# Patient Record
Sex: FEMALE | Race: WHITE | Employment: FULL TIME | URBAN - METROPOLITAN AREA
[De-identification: names, ages, dates, MRNs, and addresses within clinical notes are randomized per-mention and may not be internally consistent; named-entity substitution may affect disease eponyms.]

---

## 2020-09-28 ENCOUNTER — VIRTUAL VISIT (OUTPATIENT)
Dept: FAMILY MEDICINE CLINIC | Age: 25
End: 2020-09-28
Payer: MEDICAID

## 2020-09-28 DIAGNOSIS — M26.622 ARTHRALGIA OF LEFT TEMPOROMANDIBULAR JOINT: Primary | ICD-10-CM

## 2020-09-28 DIAGNOSIS — M26.609 TMJ DYSFUNCTION: ICD-10-CM

## 2020-09-28 PROCEDURE — 99202 OFFICE O/P NEW SF 15 MIN: CPT | Performed by: NURSE PRACTITIONER

## 2020-09-28 RX ORDER — PREDNISONE 10 MG/1
TABLET ORAL
Qty: 21 TAB | Refills: 0 | Status: SHIPPED | OUTPATIENT
Start: 2020-09-28 | End: 2021-04-13 | Stop reason: ALTCHOICE

## 2020-09-28 RX ORDER — AMITRIPTYLINE HYDROCHLORIDE 25 MG/1
25 TABLET, FILM COATED ORAL
Qty: 30 TAB | Refills: 1 | Status: SHIPPED | OUTPATIENT
Start: 2020-09-28 | End: 2020-11-05 | Stop reason: SINTOL

## 2020-09-28 RX ORDER — METHOCARBAMOL 750 MG/1
TABLET, FILM COATED ORAL
Qty: 40 TAB | Refills: 0 | Status: SHIPPED | OUTPATIENT
Start: 2020-09-28 | End: 2020-11-05 | Stop reason: SDUPTHER

## 2020-09-28 RX ORDER — METHOCARBAMOL 750 MG/1
TABLET, FILM COATED ORAL
COMMUNITY
Start: 2020-07-30 | End: 2020-09-28 | Stop reason: SDUPTHER

## 2020-09-28 RX ORDER — TRIPROLIDINE/PSEUDOEPHEDRINE 2.5MG-60MG
800 TABLET ORAL
Qty: 473 ML | Refills: 1 | Status: SHIPPED | OUTPATIENT
Start: 2020-09-28 | End: 2021-01-07 | Stop reason: ALTCHOICE

## 2020-09-28 RX ORDER — IBUPROFEN 600 MG/1
600 TABLET ORAL
COMMUNITY
Start: 2020-01-20 | End: 2021-01-07 | Stop reason: ALTCHOICE

## 2020-09-28 NOTE — PROGRESS NOTES
Angus Oneal is a 23 y.o. female who was seen by synchronous (real-time) audio-video technology on 9/28/2020 for Jaw Pain    Assessment & Plan:   Diagnoses and all orders for this visit:    1. Arthralgia of left temporomandibular joint    2. TMJ dysfunction  -     REFERRAL TO PHYSICAL THERAPY    Other orders  -     predniSONE (STERAPRED DS) 10 mg dose pack; See administration instruction per 10mg dose pack  -     methocarbamoL (ROBAXIN) 750 mg tablet; take 1 tablet by mouth three times a day as needed for TMJ muscle spasm  -     ibuprofen (ADVIL;MOTRIN) 100 mg/5 mL suspension; Take 40 mL by mouth three (3) times daily as needed (pain, take with food). -     amitriptyline (ELAVIL) 25 mg tablet; Take 1 Tab by mouth nightly. Follow-up and Dispositions    · Return in about 2 weeks (around 10/12/2020) for TMJ left, virtual follow up. I spent at least 30 minutes on this visit with this established patient. [id number]   Subjective:   Patient symptoms have been present since May. States she has had intermittent TMJ on the left after having her wisdom teeth pulled May 2019. Comments symptoms have been manageable until recently. States she was seen by the oral surgeon and informed it was TMJ and prescribed flexeril, prednisone, and ibuprofen. Comments she has not been able to chew. Reports she has only been able to eat soft foods. Comments she has not been able to chew. Reports she has been seen by another oral surgeon and states there provider wanted her to buy a mouth guard which she also ready has. Mother reports patient had complications after having wisdom teeth removed. Reports she had 3 dry sockets and also had to be put under anesthesia x2 due to jaw spasm being so tight. States she was back and forth to the oral surgeon for 6 months after having wisdom teeth extracted. Patient was advised she could try physical therapy but states she can't find someone that will take her insurance.   [employer] Medicaid. Prior to Admission medications    Not on File     There is no problem list on file for this patient. There are no active problems to display for this patient. Allergies not on file  No past medical history on file. No past surgical history on file. No family history on file. Social History     Tobacco Use    Smoking status: Not on file   Substance Use Topics    Alcohol use: Not on file     Review of Systems   HENT:        Jaw pain     Objective:   No flowsheet data found. General: alert, cooperative, no distress   Mental  status: normal mood, behavior, speech, dress, motor activity, and thought processes, able to follow commands   HENT: NCAT   Neck: no visualized mass   Resp: no respiratory distress   Neuro: no gross deficits   Skin: no discoloration or lesions of concern on visible areas   Psychiatric: normal affect, consistent with stated mood, no evidence of hallucinations     Additional exam findings: We discussed the expected course, resolution and complications of the diagnosis(es) in detail. Medication risks, benefits, costs, interactions, and alternatives were discussed as indicated. I advised her to contact the office if her condition worsens, changes or fails to improve as anticipated. She expressed understanding with the diagnosis(es) and plan. Abebe Arellano, who was evaluated through a patient-initiated, synchronous (real-time) audio-video encounter, and/or her healthcare decision maker, is aware that it is a billable service, with coverage as determined by her insurance carrier. She provided verbal consent to proceed: Yes, and patient identification was verified. It was conducted pursuant to the emergency declaration under the [facility], [address] [place] [address] authority and the [employer] General Act. A caregiver was present when appropriate. Ability to conduct physical exam was limited.  I was in the office. The patient was at home.       Lavelle Wheeler NP

## 2020-10-09 ENCOUNTER — HOSPITAL ENCOUNTER (OUTPATIENT)
Dept: PHYSICAL THERAPY | Age: 25
Discharge: HOME | End: 2020-10-09
Payer: COMMERCIAL

## 2020-10-09 PROCEDURE — 97110 THERAPEUTIC EXERCISES: CPT

## 2020-10-09 PROCEDURE — 97032 APPL MODALITY 1+ESTIM EA 15: CPT

## 2020-10-09 PROCEDURE — 97162 PT EVAL MOD COMPLEX 30 MIN: CPT

## 2020-10-09 NOTE — PROGRESS NOTES
[facility]  [address]  [place], [address]  [phone] fax    Plan of Care/ Statement of Necessity for Physical Therapy Services    Patient name: Abiel Whitmore Start of Care: 10/9/2020   Referral source: Travis Arboleda NP : 1995    Medical Diagnosis: TMJ dysfunction [M26.609]  Payor: [address] [place] [address] Road / Plan: [address] / Product Type: Managed Care Medicaid /  Onset Date:May 2020    Treatment Diagnosis: Left TMJ pain   Prior Hospitalization: see medical history Provider#: [id number]   Medications: Verified on Patient summary List    Comorbidities: none reported   Prior Level of Function: Able to talk, chew, eat without pain     The Plan of Care and following information is based on the information from the initial evaluation. Assessment/ key information: 23y.o. year old female presents with CC of chronic left TMJ pain . Patient reports pain began in May 2019 after patient had all four wisdom teeth removed. Patient reports she took NSAIDs for 8 months following surgery; she also was fitted for a mouth guard to wear when sleeping. Patient reports pain was exacerbated this past May. Impairments noted today:  Right lateral jaw deviation with mandibular opening; multiple Trps noted in left masseter, pterygoids, SCM, and scalenes; hypermobile C/S with poor postural awareness and poor deep C/S flexor stability and poor scapular stability. Patient will benefit from physical therapy to address deficits, and ultimately to return patient to prior level of function.     Evaluation Complexity History LOW Complexity : Zero comorbidities / personal factors that will impact the outcome / POC; Examination MEDIUM Complexity : 3 Standardized tests and measures addressing body structure, function, activity limitation and / or participation in recreation  ;Presentation MEDIUM Complexity : Evolving with changing characteristics ;Clinical Decision Making MEDIUM Complexity : FOTO score of 26-74  Overall Complexity Rating: MEDIUM  Problem List: pain affecting function, decrease ROM, decrease strength, decrease ADL/ functional abilitiies, decrease activity tolerance, decrease flexibility/ joint mobility and other postural awareness   Treatment Plan may include any combination of the following: Therapeutic exercise, Therapeutic activities, Neuromuscular re-education, Physical agent/modality, Manual therapy and Patient education  Patient / Family readiness to learn indicated by: asking questions, trying to perform skills and interest  Persons(s) to be included in education: patient (P)  Barriers to Learning/Limitations: None  Patient Goal (s): to be able to talk and eat without pain  Patient Self Reported Health Status: poor  Rehabilitation Potential: good    Short Term Goals: To be accomplished in 2 weeks:  1. I and compliant with HEP for self management of symptoms. Long Term Goals: To be accomplished in 4 weeks:  1. Improve FOTO to 67 to indicate improved function with daily activities. 2. Perform mandibular opening without lateral deviation to increase ease with chewing food. 3. Increase scapular strength B to 4/5 to improve overall stability for postural awareness. 4. Patient will report >50% improvement with pain to increase ease with talking and chewing food. Frequency / Duration: Patient to be seen 2 times per week for 4 weeks. Patient/ Caregiver education and instruction: Diagnosis, prognosis, self care, exercises and other posture   [x]  Plan of care has been reviewed with NNEKA Fischer, PT 10/9/2020 5:06 PM    ________________________________________________________________________    I certify that the above Therapy Services are being furnished while the patient is under my care. I agree with the treatment plan and certify that this therapy is necessary.     [facility] Signature:____________Date:_________TIME:________    [employer], Date and Time must be completed for valid certification **    Please sign and return to In [address]  [address]  [place], [address]  [phone] fax

## 2020-10-09 NOTE — PROGRESS NOTES
PT DAILY TREATMENT NOTE 10-18    Patient Name: Skylar Rachel  Date:10/9/2020  : 1995  [x]  Patient  Verified  Payor: [address] [place] [address] Road / Plan: [address] / Product Type: Managed Care Medicaid /    In time:421  Out time:503  Total Treatment Time (min): 42  Visit #: 1 of 8      Treatment Area: TMJ dysfunction [M26.609]    SUBJECTIVE  Pain Level (0-10 scale): 5  Any medication changes, allergies to medications, adverse drug reactions, diagnosis change, or new procedure performed?: [x] No    [] Yes (see summary sheet for update)  Subjective functional status/changes:   [] No changes reported  See eval    OBJECTIVE    Modality rationale: decrease inflammation, decrease pain and increase tissue extensibility to improve the patients ability to eat and chew food   Min Type Additional Details    [] Estim:  []Unatt       []IFC  []Premod                        []Other:  []w/ice   []w/heat  Position:  Location:   8 [x] Estim: [x]Att    []TENS instruct  []NMES                    []Other:  [x]w/US   []w/ice   []w/heat  Position:supine  Location:left TMJ/SCM    []  Traction: [] Cervical       []Lumbar                       [] Prone          []Supine                       []Intermittent   []Continuous Lbs:  [] before manual  [] after manual    []  Ultrasound: []Continuous   [] Pulsed                           []1MHz   []3MHz W/cm2:  Location:    []  Iontophoresis with dexamethasone         Location: [] Take home patch   [] In clinic    []  Ice     []  heat  []  Ice massage  []  Laser   []  Anodyne Position:  Location:    []  Laser with stim  []  Other:  Position:  Location:    []  Vasopneumatic Device Pressure:       [] lo [] med [] hi   Temperature: [] lo [] med [] hi   [] Skin assessment post-treatment:  []intact []redness- no adverse reaction    []redness  adverse reaction:     15 min [x]Eval                  []Re-Eval       19 min Therapeutic Exercise:  [] See flow sheet :   Rationale: increase ROM and increase strength to improve the patients ability to perform daily activities      With   [] TE   [] TA   [] neuro   [] other: Patient Education: [x] Review HEP    [] Progressed/Changed HEP based on:   [] positioning   [] body mechanics   [] transfers   [] heat/ice application    [] other:      Other Objective/Functional Measures:      Pain Level (0-10 scale) post treatment: 3    ASSESSMENT/Changes in Function: see POC    Patient will continue to benefit from skilled PT services to modify and progress therapeutic interventions, address functional mobility deficits, address ROM deficits, address strength deficits, analyze and address soft tissue restrictions, analyze and cue movement patterns and assess and modify postural abnormalities to attain remaining goals. [x]  See Plan of Care  []  See progress note/recertification  []  See Discharge Summary         Progress towards goals / Updated goals:  Short Term Goals: To be accomplished in 2 weeks:  1. I and compliant with HEP for self management of symptoms  IE: issued HEP. Long Term Goals: To be accomplished in 4 weeks:  1. Improve FOTO to 67 to indicate improved function with daily activities. IE: 64  2. Perform mandibular opening without lateral deviation to increase ease with chewing food. IE: right lateral deviation  3. Increase scapular strength B to 4/5 to improve overall stability for postural awareness. IE: 3/5  4. Patient will report >50% improvement with pain to increase ease with talking and chewing food. IE: 0%  PLAN  []  Upgrade activities as tolerated     [x]  Continue plan of care  []  Update interventions per flow sheet       []  Discharge due to:_  []  Other:_      KAVITA Maciel, BINHPT 10/9/2020  5:13 PM    No future appointments.

## 2020-10-16 ENCOUNTER — HOSPITAL ENCOUNTER (OUTPATIENT)
Dept: PHYSICAL THERAPY | Age: 25
Discharge: HOME | End: 2020-10-16
Payer: COMMERCIAL

## 2020-10-16 PROCEDURE — 97032 APPL MODALITY 1+ESTIM EA 15: CPT

## 2020-10-16 PROCEDURE — 97112 NEUROMUSCULAR REEDUCATION: CPT

## 2020-10-16 PROCEDURE — 97110 THERAPEUTIC EXERCISES: CPT

## 2020-10-16 NOTE — PROGRESS NOTES
PT DAILY TREATMENT NOTE 10-18    Patient Name: Rajendra Villeda  Date:10/16/2020  : 1995  [x]  Patient  Verified  Payor: [address] [place] [address] Road / Plan: [address] / Product Type: Managed Care Medicaid /    In time:1239  Out time:124  Total Treatment Time (min): 45  Visit #: 2 of 8    Treatment Area: TMJ dysfunction [M26.609]    SUBJECTIVE  Pain Level (0-10 scale): 0  Any medication changes, allergies to medications, adverse drug reactions, diagnosis change, or new procedure performed?: [x] No    [] Yes (see summary sheet for update)  Subjective functional status/changes:   [] No changes reported  I just woke up so I don't have any pain. It usually just comes in the evenings or when I'm really stressed out.      OBJECTIVE    Modality rationale: decrease inflammation, decrease pain and increase tissue extensibility to improve the patients ability to chew and eat without pain   Min Type Additional Details    [] Estim:  []Unatt       []IFC  []Premod                        []Other:  []w/ice   []w/heat  Position:  Location:   8 [x] Estim: [x]Att    []TENS instruct  []NMES                    []Other:  [x]w/US   []w/ice   []w/heat  Position:supine  Location:left TMJ and SCM/scalenes    []  Traction: [] Cervical       []Lumbar                       [] Prone          []Supine                       []Intermittent   []Continuous Lbs:  [] before manual  [] after manual    []  Ultrasound: []Continuous   [] Pulsed                           []1MHz   []3MHz W/cm2:  Location:    []  Iontophoresis with dexamethasone         Location: [] Take home patch   [] In clinic    []  Ice     []  heat  []  Ice massage  []  Laser   []  Anodyne Position:  Location:    []  Laser with stim  []  Other:  Position:  Location:    []  Vasopneumatic Device Pressure:       [] lo [] med [] hi   Temperature: [] lo [] med [] hi   [] Skin assessment post-treatment:  []intact []redness- no adverse reaction        22 min Therapeutic Exercise:  [] See flow sheet :   Rationale: increase ROM and increase strength to improve the patients ability to perform daily activities      15 min Neuromuscular Re-education:  []  See flow sheet :   Rationale: increase ROM, increase strength, improve balance and increase proprioception  to improve the patients stability for postural awareness for work tasks    With   [] TE   [] TA   [] neuro   [] other: Patient Education: [x] Review HEP    [] Progressed/Changed HEP based on:   [] positioning   [] body mechanics   [] transfers   [] heat/ice application    [] other:      Other Objective/Functional Measures:      Pain Level (0-10 scale) post treatment: 5    ASSESSMENT/Changes in Function: Poor T/S mobility noted in ext. Added 1/2 foam pec stretch to improve T/S mobility. Tactile cueing to avoid dumping into ext with QP exercises. Patient will continue to benefit from skilled PT services to modify and progress therapeutic interventions, address functional mobility deficits, address ROM deficits, address strength deficits, analyze and address soft tissue restrictions, analyze and cue movement patterns and assess and modify postural abnormalities to attain remaining goals. []  See Plan of Care  []  See progress note/recertification  []  See Discharge Summary         Progress towards goals / Updated goals:  Short Term Goals: To be accomplished in 2 weeks:  1. I and compliant with HEP for self management of symptoms  IE: issued HEP  Current: reports compliance 10/16/20.   Long Term Goals: To be accomplished in 4 weeks:  1. Improve FOTO to 67 to indicate improved function with daily activities. IE: 64  2. Perform mandibular opening without lateral deviation to increase ease with chewing food. IE: right lateral deviation  3. Increase scapular strength B to 4/5 to improve overall stability for postural awareness. IE: 3/5  4.  Patient will report >50% improvement with pain to increase ease with talking and chewing food.   IE: 0%    PLAN  []  Upgrade activities as tolerated     [x]  Continue plan of care  []  Update interventions per flow sheet       []  Discharge due to:_  []  Other:_      Kristina Cleveland, MPT, CMTPT 10/16/2020  8:57 AM    Future Appointments   Date Time Provider Rory Elliott   10/16/2020 12:45 PM Rin Edwards, PT [facility]   10/20/2020 12:45 PM Rin Edwards, PT [facility]   10/23/2020  1:30 PM Rin Edwards, PT [facility]   10/27/2020 12:00 PM Rin Edwards, PT [facility]   10/30/2020 12:45 PM Rin Edwards, PT [facility]   11/3/2020 12:00 PM Rin Edwards, PT [facility]   11/6/2020  2:15 PM Rin Edwards, PT [facility]

## 2020-10-20 ENCOUNTER — HOSPITAL ENCOUNTER (OUTPATIENT)
Dept: PHYSICAL THERAPY | Age: 25
Discharge: HOME | End: 2020-10-20
Payer: COMMERCIAL

## 2020-10-20 PROCEDURE — 97110 THERAPEUTIC EXERCISES: CPT | Performed by: PHYSICAL THERAPIST

## 2020-10-20 PROCEDURE — 97112 NEUROMUSCULAR REEDUCATION: CPT | Performed by: PHYSICAL THERAPIST

## 2020-10-20 PROCEDURE — 97035 APP MDLTY 1+ULTRASOUND EA 15: CPT | Performed by: PHYSICAL THERAPIST

## 2020-10-23 ENCOUNTER — HOSPITAL ENCOUNTER (OUTPATIENT)
Dept: PHYSICAL THERAPY | Age: 25
Discharge: HOME | End: 2020-10-23
Payer: COMMERCIAL

## 2020-10-23 PROCEDURE — 97032 APPL MODALITY 1+ESTIM EA 15: CPT

## 2020-10-23 PROCEDURE — 97110 THERAPEUTIC EXERCISES: CPT

## 2020-10-23 PROCEDURE — 97112 NEUROMUSCULAR REEDUCATION: CPT

## 2020-10-23 NOTE — PROGRESS NOTES
PT DAILY TREATMENT NOTE 10-18    Patient Name: Estefania Fritz  Date:10/23/2020  : 1995  [x]  Patient  Verified  Payor: [address] [place] [address] Road / Plan: [address] / Product Type: Managed Care Medicaid /    In time:147  Out time:211  Total Treatment Time (min): 24  Visit #: 4 of 8  16' late    Treatment Area: TMJ dysfunction [M26.609]    SUBJECTIVE  Pain Level (0-10 scale): 5  Any medication changes, allergies to medications, adverse drug reactions, diagnosis change, or new procedure performed?: [] No    [x] Yes (see summary sheet for update)  Subjective functional status/changes:   [] No changes reported  I stopped taking my antidepressant. I didn't like how it made me feel. Reports she thinks the medicine was helping her pain, but the side effects were not worth taking the medicine.     OBJECTIVE    Modality rationale: decrease pain and increase tissue extensibility to improve the patients ability to chew and talk without pain   Min Type Additional Details    [] Estim:  []Unatt       []IFC  []Premod                        []Other:  []w/ice   []w/heat  Position:  Location:   8 [x] Estim: [x]Att    []TENS instruct  []NMES                    []Other:  [x]w/US   []w/ice   []w/heat  Position:supine  Location:left TMJ    []  Traction: [] Cervical       []Lumbar                       [] Prone          []Supine                       []Intermittent   []Continuous Lbs:  [] before manual  [] after manual    []  Ultrasound: []Continuous   [] Pulsed                           []1MHz   []3MHz W/cm2:  Location:    []  Iontophoresis with dexamethasone         Location: [] Take home patch   [] In clinic    []  Ice     []  heat  []  Ice massage  []  Laser   []  Anodyne Position:  Location:    []  Laser with stim  []  Other:  Position:  Location:    []  Vasopneumatic Device Pressure:       [] lo [] med [] hi   Temperature: [] lo [] med [] hi   [] Skin assessment post-treatment:  []intact []redness- no adverse reaction    []redness  adverse reaction:       8 min Therapeutic Exercise:  [] See flow sheet :   Rationale: increase ROM and increase strength to improve the patients ability to perform daily activities      8 min Neuromuscular Re-education:  []  See flow sheet :   Rationale: increase strength, improve coordination, improve balance and increase proprioception  to improve the patients postural awareness and stability     With   [] TE   [] TA   [] neuro   [] other: Patient Education: [x] Review HEP    [] Progressed/Changed HEP based on:   [] positioning   [] body mechanics   [] transfers   [] heat/ice application    [] other:      Other Objective/Functional Measures:      Pain Level (0-10 scale) post treatment: 0    ASSESSMENT/Changes in Function: Increased ability to perform lateral deviation equally B. Patient admits that she clenches jaw consistently throughout the day. She reports she is going to speak to her doctor about possibly taking anti-anxiety medication. Patient will continue to benefit from skilled PT services to modify and progress therapeutic interventions, address functional mobility deficits, address strength deficits, analyze and address soft tissue restrictions, analyze and cue movement patterns and assess and modify postural abnormalities to attain remaining goals. []  See Plan of Care  []  See progress note/recertification  []  See Discharge Summary         Progress towards goals / Updated goals:  Short Term Goals: To be accomplished in 2 weeks:  1. I and compliant with HEP for self management of symptoms  IE: issued HEP  Current: reports compliance 10/16/20.   Long Term Goals: To be accomplished in 4 weeks:  1. Improve FOTO GO 46 YV indicate improved function with daily activities.   IE: 56  2. Perform mandibular opening without lateral deviation to increase ease with chewing food.   IE: right lateral deviation  Current: remains w/ right lateral deviation, improved after manual today 10/20/2020  3. Increase scapular strength B to 4/5 to improve overall stability for postural awareness. IE: 3/5  4.  Patient will report >50% improvement with pain to increase ease with talking and chewing food.   IE: 0%    PLAN  []  Upgrade activities as tolerated     [x]  Continue plan of care  []  Update interventions per flow sheet       []  Discharge due to:_  []  Other:_      Kristina Cleveland, KAVITA, CMTPT 10/23/2020  9:08 AM    Future Appointments   Date Time Provider Rory Elliott   10/23/2020  1:30 PM Rin Edwards, PT [facility]   10/27/2020 12:00 PM Rin Edwards, PT [facility]   10/30/2020 12:45 PM Rin Edwards PT [facility]   11/3/2020 12:00 PM Rin Edwards, PT [facility]   11/5/2020  1:00 PM Mounika Hartman NP [facility]   11/6/2020  2:15 PM Rin Edwards, PT [facility]

## 2020-10-27 ENCOUNTER — HOSPITAL ENCOUNTER (OUTPATIENT)
Dept: PHYSICAL THERAPY | Age: 25
Discharge: HOME | End: 2020-10-27
Payer: COMMERCIAL

## 2020-10-27 PROCEDURE — 97112 NEUROMUSCULAR REEDUCATION: CPT

## 2020-10-27 PROCEDURE — 97032 APPL MODALITY 1+ESTIM EA 15: CPT

## 2020-10-27 PROCEDURE — 97110 THERAPEUTIC EXERCISES: CPT

## 2020-10-27 NOTE — PROGRESS NOTES
PT DAILY TREATMENT NOTE 10-18    Patient Name: Masoud Cancino  Date:10/27/2020  : 1995  [x]  Patient  Verified  Payor: [address] [place] [address] Road / Plan: [address] / Product Type: Managed Care Medicaid /    In time:1200  Out time:1244  Total Treatment Time (min): 44  Visit #: 5 of 8    Treatment Area: TMJ dysfunction [M26.609]    SUBJECTIVE  Pain Level (0-10 scale): 3  Any medication changes, allergies to medications, adverse drug reactions, diagnosis change, or new procedure performed?: [x] No    [] Yes (see summary sheet for update)  Subjective functional status/changes:   [] No changes reported  My pain isn't that bad today because I just woke up and I haven't done much talking.     OBJECTIVE    Modality rationale: decrease inflammation, decrease pain and increase tissue extensibility to improve the patients ability to chew and talk without pain   Min Type Additional Details    [] Estim:  []Unatt       []IFC  []Premod                        []Other:  []w/ice   []w/heat  Position:  Location:   8  With setup [x] Estim: [x]Att    []TENS instruct  []NMES                    []Other:  [x]w/US   []w/ice   []w/heat  Position:supine  Location:left TMJ    []  Traction: [] Cervical       []Lumbar                       [] Prone          []Supine                       []Intermittent   []Continuous Lbs:  [] before manual  [] after manual    []  Ultrasound: []Continuous   [] Pulsed                           []1MHz   []3MHz W/cm2:  Location:    []  Iontophoresis with dexamethasone         Location: [] Take home patch   [] In clinic    []  Ice     []  heat  []  Ice massage  []  Laser   []  Anodyne Position:  Location:    []  Laser with stim  []  Other:  Position:  Location:    []  Vasopneumatic Device Pressure:       [] lo [] med [] hi   Temperature: [] lo [] med [] hi   [] Skin assessment post-treatment:  []intact []redness- no adverse reaction    []redness - adverse reaction:       19 min Therapeutic Exercise:  [] See flow sheet :   Rationale: increase ROM and increase strength to improve the patients ability to perfomr daily activities      15 min Neuromuscular Re-education:  []  See flow sheet :   Rationale: increase strength, improve coordination, improve balance and increase proprioception  to improve the patients ability to recruit anterior and posterior chain appropriately for postural awareness     2 min Manual Therapy:  STM/ TrP release left masseter   Rationale: decrease pain, increase ROM and increase tissue extensibility to allow for pain-free talking and chewing         With   [] TE   [] TA   [] neuro   [] other: Patient Education: [x] Review HEP    [] Progressed/Changed HEP based on:   [] positioning   [] body mechanics   [] transfers   [] heat/ice application    [] other:      Other Objective/Functional Measures:      Pain Level (0-10 scale) post treatment: 2    ASSESSMENT/Changes in Function: No lateral jaw deviation noted today with AROM. Less cueing for form with most exercises. Able to maintain axial elongation during prone c/S retraction. Patient will continue to benefit from skilled PT services to modify and progress therapeutic interventions, address functional mobility deficits, address ROM deficits, address strength deficits, analyze and address soft tissue restrictions, analyze and cue movement patterns and assess and modify postural abnormalities to attain remaining goals. []  See Plan of Care  []  See progress note/recertification  []  See Discharge Summary         Progress towards goals / Updated goals:  Short Term Goals: To be accomplished in 2 weeks:  1. I and compliant with HEP for self management of symptoms  IE: issued HEP  Current: reports compliance 10/16/20.   Long Term Goals: To be accomplished in 4 weeks:  1. Improve FOTO YF 78 NE indicate improved function with daily activities.   IE: 56  2.  Perform mandibular opening without lateral deviation to increase ease with chewing food. IE: right lateral deviation  Current: remains w/ right lateral deviation, improved after manual today 10/20/2020; no lateral deviation today 10/27/20  3. Increase scapular strength B to 4/5 to improve overall stability for postural awareness. IE: 3/5  4.  Patient will report >50% improvement with pain to increase ease with talking and   IE: 0%  Current:   PLAN  []  Upgrade activities as tolerated     [x]  Continue plan of care  []  Update interventions per flow sheet       []  Discharge due to:_  []  Other:_      Cheryl Peralta, MPT, CMTPT 10/27/2020  8:26 AM    Future Appointments   Date Time Provider Rory Elliott   10/27/2020 12:00 PM [facility] [place]   10/30/2020 12:45 PM Leveda Navy, PT [facility]   11/3/2020 12:00 PM Leveda [place], PT [facility]   11/5/2020  1:00 PM Fang Valerio NP [facility] BS AMB   11/6/2020  2:15 PM Leveda [place], PT [facility]

## 2020-10-30 ENCOUNTER — HOSPITAL ENCOUNTER (OUTPATIENT)
Dept: PHYSICAL THERAPY | Age: 25
Discharge: HOME | End: 2020-10-30
Payer: COMMERCIAL

## 2020-10-30 PROCEDURE — 97112 NEUROMUSCULAR REEDUCATION: CPT

## 2020-10-30 PROCEDURE — 97032 APPL MODALITY 1+ESTIM EA 15: CPT

## 2020-10-30 PROCEDURE — 97110 THERAPEUTIC EXERCISES: CPT

## 2020-10-30 NOTE — PROGRESS NOTES
PT DAILY TREATMENT NOTE 10-18    Patient Name: Rashida Briggs  Date:10/30/2020   : 1995  [x]  Patient  Verified  Payor: [address] [place] [address] Road / Plan: [address] / Product Type: Managed Care Medicaid /    In time:1246  Out time:128  Total Treatment Time (min): 42  Visit #: 6 of 8      Treatment Area: TMJ dysfunction [M26.609]    SUBJECTIVE  Pain Level (0-10 scale): 2  Any medication changes, allergies to medications, adverse drug reactions, diagnosis change, or new procedure performed?: [x] No    [] Yes (see summary sheet for update)  Subjective functional status/changes:   [] No changes reported  The intensity of the pain is not as bad. But I do feel a difference in the pain since coming off the antidepressant. The medication helped the pain, but I didn't like how I felt on it. I go back to the doctor next week, so hopefully she will be able to put me on something for my anxiety.      OBJECTIVE    Modality rationale: decrease pain to improve the patients ability to tolerate eating and talking without pain   Min Type Additional Details    [] Estim:  []Unatt       []IFC  []Premod                        []Other:  []w/ice   []w/heat  Position:  Location:   8 [x] Estim: []Att    []TENS instruct  []NMES                    []Other:  [x]w/US   []w/ice   []w/heat  Position:supine  Location:left TMJ    []  Traction: [] Cervical       []Lumbar                       [] Prone          []Supine                       []Intermittent   []Continuous Lbs:  [] before manual  [] after manual    []  Ultrasound: []Continuous   [] Pulsed                           []1MHz   []3MHz W/cm2:  Location:    []  Iontophoresis with dexamethasone         Location: [] Take home patch   [] In clinic    []  Ice     []  heat  []  Ice massage  []  Laser   []  Anodyne Position:  Location:    []  Laser with stim  []  Other:  Position:  Location:    []  Vasopneumatic Device Pressure:       [] lo [] med [] hi   Temperature: [] lo [] med [] hi   [] Skin assessment post-treatment:  []intact []redness- no adverse reaction        17 min Therapeutic Exercise:  [] See flow sheet :   Rationale: increase ROM and increase strength to improve the patients ability to perform daily activities      15 min Neuromuscular Re-education:  []  See flow sheet :   Rationale: increase strength  to improve the patients anterior and posterior chain for stability    2 min Manual Therapy:  STM/TrP release to left TMJ and masseter   Rationale: decrease pain, increase ROM, increase tissue extensibility and decrease trigger points to eat and talk without pain        With   [] TE   [] TA   [] neuro   [] other: Patient Education: [x] Review HEP    [] Progressed/Changed HEP based on:   [] positioning   [] body mechanics   [] transfers   [] heat/ice application    [] other:      Other Objective/Functional Measures:      Pain Level (0-10 scale) post treatment: 2    ASSESSMENT/Changes in Function: Added 1# to prone scapular stability exercises; tolerated exercises well without cueing. Patient will continue to benefit from skilled PT services to modify and progress therapeutic interventions, address strength deficits, analyze and address soft tissue restrictions, analyze and cue movement patterns and assess and modify postural abnormalities to attain remaining goals. []  See Plan of Care  []  See progress note/recertification  []  See Discharge Summary         Progress towards goals / Updated goals:  Short Term Goals: To be accomplished in 2 weeks:  1. I and compliant with HEP for self management of symptoms  IE: issued HEP  Current: reports compliance 10/16/20.   Long Term Goals: To be accomplished in 4 weeks:  1. Improve FOTO [address] 53 XW indicate improved function with daily activities.   IE: 56  2. Perform mandibular opening without lateral deviation to increase ease with chewing food.   IE: right lateral deviation  Current: remains w/ right lateral deviation, improved after manual today 10/20/2020; no lateral deviation today 10/27/20  3. Increase scapular strength B to 4/5 to improve overall stability for postural awareness. IE: 3/5  Current: grossly 3+/5 10/30/20  4.  Patient will report >50% improvement with pain to increase ease with talking and   IE: 0%  Current:     PLAN  []  Upgrade activities as tolerated     [x]  Continue plan of care  []  Update interventions per flow sheet       []  Discharge due to:_  []  Other:_      Dorothy Galvan, KAVITA, CMTPT 10/30/2020  8:44 AM    Future Appointments   Date Time Provider Rory Elliott   10/30/2020 12:45 PM Merline Gammon B, PT [facility]   11/3/2020 12:00 PM Lb Patient, PT [facility]   11/5/2020  1:00 PM Amaris Reynolds NP [facility] BS AMB   11/6/2020  2:15 PM Lb Patient, PT [facility]

## 2020-11-03 ENCOUNTER — HOSPITAL ENCOUNTER (OUTPATIENT)
Dept: PHYSICAL THERAPY | Age: 25
Discharge: HOME | End: 2020-11-03
Payer: COMMERCIAL

## 2020-11-03 PROCEDURE — 97110 THERAPEUTIC EXERCISES: CPT

## 2020-11-03 PROCEDURE — 97112 NEUROMUSCULAR REEDUCATION: CPT

## 2020-11-03 PROCEDURE — 97035 APP MDLTY 1+ULTRASOUND EA 15: CPT

## 2020-11-03 NOTE — PROGRESS NOTES
PT DAILY TREATMENT NOTE 10-18    Patient Name: Abebe Arellano  Date:11/3/2020  : 1995  [x]  Patient  Verified  Payor: [address] [place] [address] Road / Plan: [address] / Product Type: Managed Care Medicaid /    In time:1200  Out time:1250  Total Treatment Time (min): 50  Visit #: 7 of 8  Treatment Area: TMJ dysfunction [M26.609]    SUBJECTIVE  Pain Level (0-10 scale): 1-2  Any medication changes, allergies to medications, adverse drug reactions, diagnosis change, or new procedure performed?: [x] No    [] Yes (see summary sheet for update)  Subjective functional status/changes:   [] No changes reported  I had a rough couple of days. I tried to eat steak the other night. About an hour afterwards, I was in so much pain. It's not as bad as it used to be because the pain was constant and now it's intermittent.        OBJECTIVE    Modality rationale: decrease pain to improve the patients ability to eat and chew without pain    Min Type Additional Details    [] Estim:  []Unatt       []IFC  []Premod                        []Other:  []w/ice   []w/heat  Position:  Location:    [] Estim: []Att    []TENS instruct  []NMES                    []Other:  []w/US   []w/ice   []w/heat  Position:  Location:    []  Traction: [] Cervical       []Lumbar                       [] Prone          []Supine                       []Intermittent   []Continuous Lbs:  [] before manual  [] after manual   8 [x]  Ultrasound: [x]Continuous   [] Pulsed                           [x]1MHz   []3MHz W/cm2:1.0  Location:left TMJ    []  Iontophoresis with dexamethasone         Location: [] Take home patch   [] In clinic    []  Ice     []  heat  []  Ice massage  []  Laser   []  Anodyne Position:  Location:    []  Laser with stim  []  Other:  Position:  Location:    []  Vasopneumatic Device Pressure:       [] lo [] med [] hi   Temperature: [] lo [] med [] hi   [] Skin assessment post-treatment:  []intact []redness- no adverse reaction []redness - adverse reaction:       19 min Therapeutic Exercise:  [] See flow sheet :   Rationale: increase ROM and increase strength to improve the patients ability to perform daily activities      20 min Neuromuscular Re-education:  []  See flow sheet :   Rationale: increase strength, improve coordination, improve balance and increase proprioception  to improve the patients ability to recruit ant and post chain appropriately for daily activities     3 min Manual Therapy:  STM left masseter/ TMJ, SCM   Rationale: decrease pain, increase ROM, increase tissue extensibility and decrease trigger points to decrease pain for chewing and eating        With   [] TE   [] TA   [] neuro   [] other: Patient Education: [x] Review HEP    [] Progressed/Changed HEP based on:   [] positioning   [] body mechanics   [] transfers   [] heat/ice application    [] other:      Other Objective/Functional Measures:      Pain Level (0-10 scale) post treatment: 0    ASSESSMENT/Changes in Function: Patient reports 50% improvement, as the pain in now intermittent instead of constant. Patient has f/u appointment with referring MD, and is going to inquire about anti-anxiety medicine. She is also going to get an exemption from wearing a mask to also help with symptom management. Patient will continue to benefit from skilled PT services to modify and progress therapeutic interventions, address functional mobility deficits, address ROM deficits, address strength deficits, analyze and address soft tissue restrictions, analyze and cue movement patterns and assess and modify postural abnormalities to attain remaining goals. []  See Plan of Care  []  See progress note/recertification  []  See Discharge Summary         Progress towards goals / Updated goals:  Short Term Goals: To be accomplished in 2 weeks:  1.  I and compliant with HEP for self management of symptoms  IE: issued HEP  Current: reports compliance 10/16/20.   [facility] be accomplished in 4 weeks:  1. Improve FOTO RZ 44 HY indicate improved function with daily activities.   IE: 56  2. Perform mandibular opening without lateral deviation to increase ease with chewing food. IE: right lateral deviation  Current: remains w/ right lateral deviation, improved after manual today 10/20/2020; no lateral deviation today 10/27/20  3. Increase scapular strength B to 4/5 to improve overall stability for postural awareness. IE: 3/5  Current: grossly 3+/5 10/30/20  4.  Patient will report >50% improvement with pain to increase ease with talking and   IE: 0%  Current:50% improvement 11/3/20     PLAN  []  Upgrade activities as tolerated     [x]  Continue plan of care  []  Update interventions per flow sheet       []  Discharge due to:_  []  Other:_      Kevin Rogers, KAVITA, CMTPT 11/3/2020  8:42 AM    Future Appointments   Date Time Provider Rory Elliott   11/3/2020 12:00 PM [facility]   11/5/2020  1:00 PM Juan Harris NP [facility] BS AMB   11/6/2020  2:15 PM Shanel Hunt PT [facility]

## 2020-11-05 ENCOUNTER — VIRTUAL VISIT (OUTPATIENT)
Dept: FAMILY MEDICINE CLINIC | Age: 25
End: 2020-11-05
Payer: MEDICAID

## 2020-11-05 DIAGNOSIS — F41.9 ANXIETY: ICD-10-CM

## 2020-11-05 DIAGNOSIS — M26.609 TMJ DYSFUNCTION: Primary | ICD-10-CM

## 2020-11-05 PROCEDURE — 99213 OFFICE O/P EST LOW 20 MIN: CPT | Performed by: NURSE PRACTITIONER

## 2020-11-05 RX ORDER — BUSPIRONE HYDROCHLORIDE 5 MG/1
5 TABLET ORAL 2 TIMES DAILY
Qty: 60 TAB | Refills: 0 | Status: SHIPPED | OUTPATIENT
Start: 2020-11-05 | End: 2020-11-19 | Stop reason: SDUPTHER

## 2020-11-05 RX ORDER — METHOCARBAMOL 750 MG/1
TABLET, FILM COATED ORAL
Qty: 40 TAB | Refills: 0 | Status: SHIPPED | OUTPATIENT
Start: 2020-11-05 | End: 2020-11-19 | Stop reason: DRUGHIGH

## 2020-11-05 NOTE — PROGRESS NOTES
Carito Sifuentes is a 23 y.o. female who was seen by synchronous (real-time) audio-video technology on 11/5/2020 for TMJ and Anxiety        Assessment & Plan:   Diagnoses and all orders for this visit:    1. TMJ dysfunction    2. Anxiety    Other orders  -     busPIRone (BUSPAR) 5 mg tablet; Take 1 Tab by mouth two (2) times a day. -     methocarbamoL (ROBAXIN) 750 mg tablet; take 1 tablet by mouth three times a day as needed for TMJ muscle spasm      Letter provided to wear face shield    Follow-up and Dispositions    · Return in about 2 weeks (around 11/19/2020) for anxiety/depression. I spent at least 15 minutes on this visit with this established patient. 712  Subjective:   Patient states TMJ has gotten a little better with PT but still is not able to eat much. Comments she thinks the mask she has to wear at work is restricting her jaw movement making pain worse. Requesting a doctor's note to wear only a face shield. Comments elavil caused her to feel like a zombie, she was crying and making her depressed. Inquiring about starting medication for anxiety. Comments she clinches her jaw due to anxiety. Reports she did notice one day she was clincching her jaw. Comments she didn't feel anxious at the time. Comments she thinks she just an anxious person. Thinks it's subconscious.     3 most recent PHQ Screens 11/5/2020   Little interest or pleasure in doing things More than half the days   Feeling down, depressed, irritable, or hopeless More than half the days   Total Score PHQ 2 4   Trouble falling or staying asleep, or sleeping too much More than half the days   Feeling tired or having little energy More than half the days   Poor appetite, weight loss, or overeating More than half the days   Feeling bad about yourself - or that you are a failure or have let yourself or your family down More than half the days   Trouble concentrating on things such as school, work, reading, or watching TV Several days Moving or speaking so slowly that other people could have noticed; or the opposite being so fidgety that others notice Not at all   Thoughts of being better off dead, or hurting yourself in some way Several days   PHQ 9 Score 14   How difficult have these problems made it for you to do your work, take care of your home and get along with others Very difficult         BSHSI AMB TREVOR-7 SCORE: 12 moderate anxiety, Somewhat difficult         Depression Severity:   0-4 None, 5-9 mild, 10-14 moderate, 15-19 moderately severe, 20-27 severe. Anxiety Severity:   0-4 None/minimal, 5-9 mild, 10-14 moderate, 15-21 severe. Prior to Admission medications    Medication Sig Start Date End Date Taking? Authorizing Provider   ibuprofen (MOTRIN) 600 mg tablet Take 600 mg by mouth every six (6) hours as needed. 1/20/20   Provider, Historical   predniSONE (STERAPRED DS) 10 mg dose pack See administration instruction per 10mg dose pack 9/28/20   Danial NGUYEN NP   methocarbamoL (ROBAXIN) 750 mg tablet take 1 tablet by mouth three times a day as needed for TMJ muscle spasm 9/28/20   Danial NGUYEN NP   ibuprofen (ADVIL;MOTRIN) 100 mg/5 mL suspension Take 40 mL by mouth three (3) times daily as needed (pain, take with food). 9/28/20   Danial NGUYEN NP   amitriptyline (ELAVIL) 25 mg tablet Take 1 Tab by mouth nightly. 9/28/20   Juan Harris NP     There is no problem list on file for this patient. There are no active problems to display for this patient. Current Outpatient Medications   Medication Sig Dispense Refill    ibuprofen (MOTRIN) 600 mg tablet Take 600 mg by mouth every six (6) hours as needed.       predniSONE (STERAPRED DS) 10 mg dose pack See administration instruction per 10mg dose pack 21 Tab 0    methocarbamoL (ROBAXIN) 750 mg tablet take 1 tablet by mouth three times a day as needed for TMJ muscle spasm 40 Tab 0    ibuprofen (ADVIL;MOTRIN) 100 mg/5 mL suspension Take 40 mL by mouth three (3) times daily as needed (pain, take with food). 473 mL 1    amitriptyline (ELAVIL) 25 mg tablet Take 1 Tab by mouth nightly. 30 Tab 1     No Known Allergies  No past medical history on file. No past surgical history on file. No family history on file. Social History     Tobacco Use    Smoking status: Never Smoker   Substance Use Topics    Alcohol use: No     ROS    Objective:   No flowsheet data found. General: alert, cooperative, no distress   Mental  status: normal mood, behavior, speech, dress, motor activity, and thought processes, able to follow commands   HENT: NCAT   Neck: no visualized mass   Resp: no respiratory distress   Neuro: no gross deficits   Skin: no discoloration or lesions of concern on visible areas   Psychiatric: normal affect, consistent with stated mood, no evidence of hallucinations     Additional exam findings: We discussed the expected course, resolution and complications of the diagnosis(es) in detail. Medication risks, benefits, costs, interactions, and alternatives were discussed as indicated. I advised her to contact the office if her condition worsens, changes or fails to improve as anticipated. She expressed understanding with the diagnosis(es) and plan. Rashida Briggs, who was evaluated through a patient-initiated, synchronous (real-time) audio-video encounter, and/or her healthcare decision maker, is aware that it is a billable service, with coverage as determined by her insurance carrier. She provided verbal consent to proceed: Yes, and patient identification was verified. It was conducted pursuant to the emergency declaration under the [address] [place] [address], [address] [place] [address] authority and the [employer] Act. A caregiver was present when appropriate. Ability to conduct physical exam was limited. I was in the office. The patient was at work.       Casilda Dandy, NP

## 2020-11-05 NOTE — LETTER
11/5/2020 1:20 PM    Ms. Ivone Morel  [id number] [phone]      To Whom It May Concern:    Ivone Morel is currently under the care of India Gonzalez Dr. Please allow her to wear face shield due to mask limiting jaw movement. If there are questions or concerns please have the patient contact our office.         Sincerely,      Christopher Leon NP

## 2020-11-06 ENCOUNTER — HOSPITAL ENCOUNTER (OUTPATIENT)
Dept: PHYSICAL THERAPY | Age: 25
Discharge: HOME | End: 2020-11-06
Payer: COMMERCIAL

## 2020-11-06 PROCEDURE — 97110 THERAPEUTIC EXERCISES: CPT

## 2020-11-06 PROCEDURE — 97112 NEUROMUSCULAR REEDUCATION: CPT

## 2020-11-06 PROCEDURE — 97035 APP MDLTY 1+ULTRASOUND EA 15: CPT

## 2020-11-06 NOTE — PROGRESS NOTES
PT DAILY TREATMENT NOTE 10-18    Patient Name: Juana Toth  Date:2020  : 1995  [x]  Patient  Verified  Payor: [address] [place] [address] Road / Plan: [address] / Product Type: Managed Care Medicaid /    In time:215  Out time:300  Total Treatment Time (min): 45  Visit #: 8 of 8  Treatment Area: TMJ dysfunction [M26.609]    SUBJECTIVE  Pain Level (0-10 scale): 1  Any medication changes, allergies to medications, adverse drug reactions, diagnosis change, or new procedure performed?: [] No    [x] Yes (see summary sheet for update)  Subjective functional status/changes:   [] No changes reported  I got on new anxiety medicine. And the doctor is writing a note for me to exempted from wearing a mask at work to help my TMJ.     OBJECTIVE    Modality rationale: decrease inflammation and decrease pain to improve the patients ability to eat and chew without pain   Min Type Additional Details    [] Estim:  []Unatt       []IFC  []Premod                        []Other:  []w/ice   []w/heat  Position:  Location:    [] Estim: []Att    []TENS instruct  []NMES                    []Other:  []w/US   []w/ice   []w/heat  Position:  Location:    []  Traction: [] Cervical       []Lumbar                       [] Prone          []Supine                       []Intermittent   []Continuous Lbs:  [] before manual  [] after manual   8 with setup [x]  Ultrasound: [x]Continuous   [] Pulsed                           [x]1MHz   []3MHz W/cm2:1.0  Location:left TMJ/masseter    []  Iontophoresis with dexamethasone         Location: [] Take home patch   [] In clinic    []  Ice     []  heat  []  Ice massage  []  Laser   []  Anodyne Position:  Location:    []  Laser with stim  []  Other:  Position:  Location:    []  Vasopneumatic Device Pressure:       [] lo [] med [] hi   Temperature: [] lo [] med [] hi   [] Skin assessment post-treatment:  []intact []redness- no adverse reaction    []redness - adverse reaction:       15 min Therapeutic Exercise:  [] See flow sheet :   Rationale: increase ROM and increase strength to improve the patients ability to perform daily activities      20 min Neuromuscular Re-education:  []  See flow sheet :   Rationale: increase strength and improve coordination  to improve the patients postural awareness for daily activities     2 min Manual Therapy: TrP release to left masseter   The manual therapy interventions were performed at a separate and distinct time from the therapeutic activities interventions. Rationale: decrease pain, increase ROM, increase tissue extensibility and decrease trigger points to reduce stress when trying to eat and chew food  With   [] TE   [] TA   [] neuro   [] other: Patient Education: [x] Review HEP    [] Progressed/Changed HEP based on:   [] positioning   [] body mechanics   [] transfers   [] heat/ice application    [] other:      Other Objective/Functional Measures:  See below     Pain Level (0-10 scale) post treatment: 1    ASSESSMENT/Changes in Function: Patient is slowly progressing towards goals. She continues to have pain with chewing tough food (I.e. meat and bread), and has pain when she is required to talk a lot at work. She would benefit from continuing with PT to further progress towards goals. Patient will continue to benefit from skilled PT services to modify and progress therapeutic interventions, address strength deficits, analyze and address soft tissue restrictions, analyze and cue movement patterns and assess and modify postural abnormalities to attain remaining goals. []  See Plan of Care  [x]  See progress note/recertification  []  See Discharge Summary         Progress towards goals / Updated goals:  Short Term Goals: To be accomplished in 2 weeks:  1. I and compliant with HEP for self management of symptoms  IE: issued HEP  Current: reports compliance .   Long Term Goals: To be accomplished in 4 weeks:  1.  Improve FOTO to 67 to indicate improved function with daily activities.   IE: 56  Current: 74- goal met  2. Perform mandibular opening without lateral deviation to increase ease with chewing food. IE: right lateral deviation  Current:mild right lateral deviation upon mandibular closing  3. Increase scapular strength B to 4/5 to improve overall stability for postural awareness. IE: 3/5  Current: grossly 3+/5   4.  Patient will report >50% improvement with pain to increase ease with talking and   IE: 0%  Current:50% improvement         PLAN  []  Upgrade activities as tolerated     [x]  Continue plan of care  []  Update interventions per flow sheet       []  Discharge due to:_  []  Other:_      KAVITA Monreal, CMTPT 11/6/2020  10:08 AM    Future Appointments   Date Time Provider Rory Elliott   11/6/2020  2:15 PM [facility]

## 2020-11-06 NOTE — PROGRESS NOTES
[facility]  [address]  [place], [address]  [phone] fax    Physical Therapy Progress Note  Patient name: Iggy Jimenez Start of Care: 10/9/20   Referral source: Lorraine Verde NP : 1995   Medical/Treatment Diagnosis: TMJ dysfunction [M26.609]  Payor: [address] [place] [address] Road / Plan: [address] / Product Type: Managed Care Medicaid /  Onset Date:May 2020     Prior Hospitalization: see medical history Provider#: [id number]   Medications: Verified on Patient Summary List    Comorbidities: none reported  Prior Level of Function: Able to talk, chew, eat without pain  Visits from Start of Care: 8    Missed Visits: 0      Short Term Goals: To be accomplished in 2 weeks:  1. I and compliant with HEP for self management of symptoms  IE: issued HEP  Current: reports compliance .   Long Term Goals: To be accomplished in 4 weeks:  1. Improve FOTO OP 74 FU indicate improved function with daily activities.   IE: 56  Current: 74- goal met  2. Perform mandibular opening without lateral deviation to increase ease with chewing food. IE: right lateral deviation  Current: mild right lateral deviation upon mandibular closing   3. Increase scapular strength B to 4/5 to improve overall stability for postural awareness. IE: 3/5  Current: grossly 3+/5   4. Patient will report >50% improvement with pain to increase ease with talking and   IE: 0%  Current:50% improvement    Key Functional Changes: FOTO improved by 18 points    Updated Goals: to be achieved in 3-4 weeks:  1. Perform mandibular opening without lateral deviation to increase ease with chewing food. IE: right lateral deviation  Current: mild right lateral deviation upon mandibular closing   2. Increase scapular strength B to 4/5 to improve overall stability for postural awareness. IE: 3/5  Current: grossly 3+/5   3.  Patient will report >50% improvement with pain to increase ease with talking and   IE: 0%  Current:50% improvement    Patient is slowly progressing towards goals. She continues to have pain with chewing tough food (I.e. meat and bread), and has pain when she is required to talk a lot at work. She would benefit from continuing with PT to further progress towards goals. ASSESSMENT/RECOMMENDATIONS:  [x]Continue therapy per initial plan/protocol at a frequency of  2 x per week for 3-4 weeks  []Continue therapy with the following recommended changes:_____________________      _____________________________________________________________________  []Discontinue therapy progressing towards or have reached established goals  []Discontinue therapy due to lack of appreciable progress towards goals  []Discontinue therapy due to lack of attendance or compliance  []Await Physician's recommendations/decisions regarding therapy  []Other:________________________________________________________________    Thank you for this referral.    Yeison Barnes, PT 11/6/2020 1:21 PM  NOTE TO PHYSICIAN:  PLEASE COMPLETE THE ORDERS BELOW AND   FAX TO [facility]: [phone]  If you are unable to process this request in 24 hours please contact our office: [phone]    ? I have read the above report and request that my patient continue as recommended. ? I have read the above report and request that my patient continue therapy with the following changes/special instructions:__________________________________________________________  ? I have read the above report and request that my patient be discharged from therapy.     Physicians signature: ______________________________Date: ______Time:______

## 2020-11-19 ENCOUNTER — VIRTUAL VISIT (OUTPATIENT)
Dept: FAMILY MEDICINE CLINIC | Age: 25
End: 2020-11-19
Payer: MEDICAID

## 2020-11-19 DIAGNOSIS — F41.9 MILD ANXIETY: Primary | ICD-10-CM

## 2020-11-19 DIAGNOSIS — F32.A MILD DEPRESSION: ICD-10-CM

## 2020-11-19 PROCEDURE — 99213 OFFICE O/P EST LOW 20 MIN: CPT | Performed by: NURSE PRACTITIONER

## 2020-11-19 RX ORDER — METHOCARBAMOL 500 MG/1
1000 TABLET, FILM COATED ORAL
Qty: 60 TAB | Refills: 1 | Status: SHIPPED | OUTPATIENT
Start: 2020-11-19 | End: 2020-12-03 | Stop reason: SDUPTHER

## 2020-11-19 RX ORDER — BUSPIRONE HYDROCHLORIDE 5 MG/1
5 TABLET ORAL
Qty: 90 TAB | Refills: 2 | Status: SHIPPED | OUTPATIENT
Start: 2020-11-19 | End: 2021-02-02

## 2020-11-19 NOTE — PROGRESS NOTES
Darryle Spangle is a 23 y.o. female who was seen by synchronous (real-time) audio-video technology on 11/19/2020 for Anxiety      Assessment & Plan:   Diagnoses and all orders for this visit:    1. Mild anxiety    2. Mild depression ([address]    Other orders  -     busPIRone (BUSPAR) 5 mg tablet; Take 1 Tab by mouth three (3) times daily (with meals). -     methocarbamoL (ROBAXIN) 500 mg tablet; Take 2 Tabs by mouth four (4) times daily as needed for Muscle Spasm(s). Follow-up and Dispositions    · Return in about 4 weeks (around 12/17/2020) for anxiety, in office follow up. I spent at least 15 minutes on this visit with this established patient. 712  Subjective:   Comments buspar is working some but feels like she would benefit from an increase. Comments she has not been in physical therapy for 2 weeks. Comment she will resume PT on Monday. Denies having any adverse effects with taking medication. Comments she feels buspar begins to wear off by mid day.     3 most recent [facility] Screens 11/19/2020 11/5/2020 9/28/2020   Little interest or pleasure in doing things Not at all More than half the days Not at all   Feeling down, depressed, irritable, or hopeless Several days More than half the days Not at all   Total Score PHQ 2 1 4 0   Trouble falling or staying asleep, or sleeping too much Several days More than half the days Not at all   Feeling tired or having little energy Several days More than half the days Not at all   Poor appetite, weight loss, or overeating Several days More than half the days Not at all   Feeling bad about yourself - or that you are a failure or have let yourself or your family down Not at all More than half the days Not at all   Trouble concentrating on things such as school, work, reading, or watching TV Several days Several days Not at all   Moving or speaking so slowly that other people could have noticed; or the opposite being so fidgety that others notice Not at all Not at all Not at all   Thoughts of being better off dead, or hurting yourself in some way Not at all Several days Not at all   PHQ 9 Score 5 14 0   How difficult have these problems made it for you to do your work, take care of your home and get along with others Not difficult at all Very difficult Not difficult at all     3 most recent PHQ Screens 11/19/2020   Little interest or pleasure in doing things Not at all   Feeling down, depressed, irritable, or hopeless Several days   Total Score PHQ 2 1   Trouble falling or staying asleep, or sleeping too much Several days   Feeling tired or having little energy Several days   Poor appetite, weight loss, or overeating Several days   Feeling bad about yourself - or that you are a failure or have let yourself or your family down Not at all   Trouble concentrating on things such as school, work, reading, or watching TV Several days   Moving or speaking so slowly that other people could have noticed; or the opposite being so fidgety that others notice Not at all   Thoughts of being better off dead, or hurting yourself in some way Not at all   PHQ 9 Score 5   How difficult have these problems made it for you to do your work, take care of your home and get along with others Not difficult at all     BSHSI AMB TREVOR-7 SCORE: 3 mild anxiety, Not difficult at all     previously 12 (11/5/2020)    Depression Severity:   0-4 None, 5-9 mild, 10-14 moderate, 15-19 moderately severe, 20-27 severe. Anxiety Severity:   0-4 None/minimal, 5-9 mild, 10-14 moderate, 15-21 severe. Prior to Admission medications    Medication Sig Start Date End Date Taking? Authorizing Provider   busPIRone (BUSPAR) 5 mg tablet Take 1 Tab by mouth two (2) times a day.  11/5/20   Lindjose Valladares T NP   methocarbamoL (ROBAXIN) 750 mg tablet take 1 tablet by mouth three times a day as needed for TMJ muscle spasm 11/5/20   Ryania Jacquelyn T NP   ibuprofen (MOTRIN) 600 mg tablet Take 600 mg by mouth every six (6) hours as needed. 1/20/20   Provider, Historical   predniSONE (STERAPRED DS) 10 mg dose pack See administration instruction per 10mg dose pack 9/28/20   Madhuri NGUYEN NP   ibuprofen (ADVIL;MOTRIN) 100 mg/5 mL suspension Take 40 mL by mouth three (3) times daily as needed (pain, take with food). 9/28/20   Jayne Bingham NP     There is no problem list on file for this patient. There are no active problems to display for this patient. Current Outpatient Medications   Medication Sig Dispense Refill    busPIRone (BUSPAR) 5 mg tablet Take 1 Tab by mouth two (2) times a day. 60 Tab 0    methocarbamoL (ROBAXIN) 750 mg tablet take 1 tablet by mouth three times a day as needed for TMJ muscle spasm 40 Tab 0    ibuprofen (MOTRIN) 600 mg tablet Take 600 mg by mouth every six (6) hours as needed.  predniSONE (STERAPRED DS) 10 mg dose pack See administration instruction per 10mg dose pack 21 Tab 0    ibuprofen (ADVIL;MOTRIN) 100 mg/5 mL suspension Take 40 mL by mouth three (3) times daily as needed (pain, take with food). 473 mL 1     No Known Allergies  No past medical history on file. No past surgical history on file. No family history on file. Social History     Tobacco Use    Smoking status: Never Smoker   Substance Use Topics    Alcohol use: No       ROS    Objective:   No flowsheet data found. General: alert, cooperative, no distress   Mental  status: normal mood, behavior, speech, dress, motor activity, and thought processes, able to follow commands   HENT: NCAT   Neck: no visualized mass   Resp: no respiratory distress   Neuro: no gross deficits   Skin: no discoloration or lesions of concern on visible areas   Psychiatric: normal affect, consistent with stated mood, no evidence of hallucinations     Additional exam findings: We discussed the expected course, resolution and complications of the diagnosis(es) in detail.   Medication risks, benefits, costs, interactions, and alternatives were discussed as indicated. I advised her to contact the office if her condition worsens, changes or fails to improve as anticipated. She expressed understanding with the diagnosis(es) and plan. Abiel Whitmore, who was evaluated through a patient-initiated, synchronous (real-time) audio-video encounter, and/or her healthcare decision maker, is aware that it is a billable service, with coverage as determined by her insurance carrier. She provided verbal consent to proceed: Yes, and patient identification was verified. It was conducted pursuant to the emergency declaration under the [address] [place] [address], [address] [place] [address] authority and the [employer] General Act. A caregiver was present when appropriate. Ability to conduct physical exam was limited. I was in the office. The patient was at home.       Lavelle Wheeler NP

## 2020-11-23 ENCOUNTER — HOSPITAL ENCOUNTER (OUTPATIENT)
Dept: PHYSICAL THERAPY | Age: 25
Discharge: HOME | End: 2020-11-23
Payer: COMMERCIAL

## 2020-11-23 PROCEDURE — 97110 THERAPEUTIC EXERCISES: CPT

## 2020-11-23 PROCEDURE — 97112 NEUROMUSCULAR REEDUCATION: CPT

## 2020-11-23 PROCEDURE — 97035 APP MDLTY 1+ULTRASOUND EA 15: CPT

## 2020-11-23 NOTE — PROGRESS NOTES
PT DAILY TREATMENT NOTE 10-18    Patient Name: Beni Finney  Date:2020  : 1995  [x]  Patient  Verified  Payor: [address] [place], [place] [address] / Plan: [address] / Product Type: Managed Care Medicaid /    In time:1029  Out time:1113  Total Treatment Time (min): 45  Visit #: 1 of -    Treatment Area: TMJ dysfunction [M26.609]    SUBJECTIVE  Pain Level (0-10 scale): 2  Any medication changes, allergies to medications, adverse drug reactions, diagnosis change, or new procedure performed?: [] No    [x] Yes (see summary sheet for update)  Subjective functional status/changes:   [] No changes reported  It's still been bothering me. I still can't eat all the food that I would like to eat. The doctor increase my anxiety medicine too. I still haven't talked to my manager about not wearing a mask at work.    OBJECTIVE    Modality rationale: decrease pain and increase tissue extensibility to improve the patients ability to eat and chew all food without pain   Min Type Additional Details    [] Estim:  []Unatt       []IFC  []Premod                        []Other:  []w/ice   []w/heat  Position:  Location:    [] Estim: []Att    []TENS instruct  []NMES                    []Other:  []w/US   []w/ice   []w/heat  Position:  Location:    []  Traction: [] Cervical       []Lumbar                       [] Prone          []Supine                       []Intermittent   []Continuous Lbs:  [] before manual  [] after manual   8 with setup [x]  Ultrasound: [x]Continuous   [] Pulsed                           [x]1MHz   []3MHz W/cm2:1.0  Location:left TMJ    []  Iontophoresis with dexamethasone         Location: [] Take home patch   [] In clinic    []  Ice     []  heat  []  Ice massage  []  Laser   []  Anodyne Position:  Location:    []  Laser with stim  []  Other:  Position:  Location:    []  Vasopneumatic Device Pressure:       [] lo [] med [] hi   Temperature: [] lo [] med [] hi   [] Skin assessment post-treatment:  []intact []redness- no adverse reaction    []redness  adverse reaction:       14 min Therapeutic Exercise:  [] See flow sheet :   Rationale: increase strength to improve the patients ability to eat and chew without pain     20 min Neuromuscular Re-education:  []  See flow sheet :   Rationale: increase strength, improve coordination, improve balance and increase proprioception  to improve the patient's stability for daily activities     3 min Manual Therapy:  STM left TMJ/masseter; T/S grade 4 PAs   The manual therapy interventions were performed at a separate and distinct time from the therapeutic activities interventions. Rationale: decrease pain, increase ROM and increase tissue extensibility to eat and chew without pain  With   [] TE   [] TA   [] neuro   [] other: Patient Education: [x] Review HEP    [] Progressed/Changed HEP based on:   [] positioning   [] body mechanics   [] transfers   [] heat/ice application    [] other:      Other Objective/Functional Measures:      Pain Level (0-10 scale) post treatment: 1    ASSESSMENT/Changes in Function: Patient presents after almost 3 weeks of no PT due to waiting on insurance authorization. No regression noted today. Mild mm restrictions persist in left masseter. Added more T/S mobility exercises to HEP and to flow sheet. Patient will continue to benefit from skilled PT services to modify and progress therapeutic interventions, address strength deficits, analyze and address soft tissue restrictions, analyze and cue movement patterns and assess and modify postural abnormalities to attain remaining goals. []  See Plan of Care  []  See progress note/recertification  []  See Discharge Summary         Progress towards goals / Updated goals:  1. Perform mandibular opening without lateral deviation to increase ease with chewing food.   IE: right lateral deviation  Current: mild right lateral deviation upon mandibular closing; no change 11/23/20 2. Increase scapular strength B to 4/5 to improve overall stability for postural awareness. IE: 3/5  Current: grossly 3+/5   3.  Patient will report >50% improvement with pain to increase ease with talking and   IE: 0%  Current:50% improvement      PLAN  []  Upgrade activities as tolerated     [x]  Continue plan of care  []  Update interventions per flow sheet       []  Discharge due to:_  []  Other:_      Elva Prater, MPT, CMTPT 11/23/2020  8:33 AM    Future Appointments   Date Time Provider Rory Elliott   11/23/2020 10:30 AM Marcia Adams B, PT [facility]   11/25/2020 10:15 AM Sekou Larry DPT [facility]   12/1/2020 12:45 PM Marcia Adams B, PT [facility]   12/3/2020  2:20 PM Triny NGUYEN NP [facility]   12/4/2020  1:30 PM Allena Lipoma, PT [facility]   12/8/2020  3:00 PM Allena Lipoma, PT [facility]   12/11/2020  3:00 PM Allena Lipoma, PT [facility]   12/15/2020  3:00 PM Allena Lipoma, PT [facility]   12/18/2020  1:00 PM Indu Cramer NP [facility] BS AMB   12/18/2020  3:00 PM Allena Lipoma, PT [facility]

## 2020-11-25 ENCOUNTER — HOSPITAL ENCOUNTER (OUTPATIENT)
Dept: PHYSICAL THERAPY | Age: 25
Discharge: HOME | End: 2020-11-25
Payer: COMMERCIAL

## 2020-11-25 PROCEDURE — 97112 NEUROMUSCULAR REEDUCATION: CPT | Performed by: PHYSICAL THERAPIST

## 2020-11-25 PROCEDURE — 97110 THERAPEUTIC EXERCISES: CPT | Performed by: PHYSICAL THERAPIST

## 2020-11-25 PROCEDURE — 97035 APP MDLTY 1+ULTRASOUND EA 15: CPT | Performed by: PHYSICAL THERAPIST

## 2020-11-25 NOTE — PROGRESS NOTES
PT DAILY TREATMENT NOTE     Patient Name: Helen Duran  Date:2020  : 1995  [x]  Patient  Verified  Payor: [address] [place] [address] Road / Plan: [address] / Product Type: Managed Care Medicaid /    In time:1016  Out time:1119  Total Treatment Time (min): 63  Visit #: 2 of 6-    Treatment Area: TMJ dysfunction [M26.609]    SUBJECTIVE  Pain Level (0-10 scale): 2/10  Any medication changes, allergies to medications, adverse drug reactions, diagnosis change, or new procedure performed?: [x] No    [] Yes (see summary sheet for update)  Subjective functional status/changes:   [] No changes reported  Pt reports no changes at this time    OBJECTIVE    Modality rationale: decrease inflammation and decrease pain to improve the patients ability to improve activity tolerance and mobility    Min Type Additional Details    [] Estim:  []Unatt       []IFC  []Premod                        []Other:  []w/ice   []w/heat  Position:  Location:    [] Estim: []Att    []TENS instruct  []NMES                    []Other:  []w/US   []w/ice   []w/heat  Position:  Location:    []  Traction: [] Cervical       []Lumbar                       [] Prone          []Supine                       []Intermittent   []Continuous Lbs:  [] before manual  [] after manual   8' [x]  Ultrasound: [x]Continuous   [] Pulsed                           []1MHz   [x]3MHz W/cm2: 1.0   Location:left TMJ    []  Iontophoresis with dexamethasone         Location: [] Take home patch   [] In clinic    []  Ice     []  heat  []  Ice massage  []  Laser   []  Anodyne Position:  Location:    []  Laser with stim  []  Other:  Position:  Location:    []  Vasopneumatic Device Pressure:       [] lo [] med [] hi   Temperature: [] lo [] med [] hi   [] Skin assessment post-treatment:  []intact []redness- no adverse reaction    []redness  adverse reaction:     16 min Therapeutic Exercise:  [x] See flow sheet : progressed per flow sheet   Rationale: increase ROM and increase strength to improve the patients ability to improve stabilization and pain     32 min Neuromuscular Re-education:  [x]  See flow sheet : cervical and scapular stability ex's per flow sheet   Rationale: increase strength, improve coordination and increase proprioception  to improve the patients ability to decrease pain and improve activity tolerance     7 min Manual Therapy:  Supine, SOR, TPR/DTM to left scalenes, rhomboids, UT, LS, cervical paraspinals, post diagastric B, masseter B, c/s PA's, MET for left 1st rib elevation w/ manual scalene str, prone t/s PA's, UPAs left, and rib springs   The manual therapy interventions were performed at a separate and distinct time from the therapeutic activities interventions. Rationale: decrease pain, increase ROM, increase tissue extensibility, decrease trigger points and increase postural awareness to decrease pain and improve activity tolerance           With   [] TE   [] TA   [] neuro   [] other: Patient Education: [x] Review HEP    [] Progressed/Changed HEP based on:   [] positioning   [] body mechanics   [] transfers   [] heat/ice application    [] other:      Other Objective/Functional Measures:      Pain Level (0-10 scale) post treatment: 1/10    ASSESSMENT/Changes in Function: Significant restrictions throughout the upper thoracic spine that improved mildly w/ manual listed above. Instructed pt to put 2 tennis balls in a sock, lay on the floor, and roll the tennis balls along either side of the upper thoracic in an attempt to improve muscular restrictions to improve segmental mobility. Pt verbalized understanding.      Patient will continue to benefit from skilled PT services to modify and progress therapeutic interventions, address functional mobility deficits, address strength deficits, analyze and address soft tissue restrictions, analyze and cue movement patterns, analyze and modify body mechanics/ergonomics and instruct in home and community integration to attain remaining goals. []  See Plan of Care  []  See progress note/recertification  []  See Discharge Summary         Progress towards goals / Updated goals:  1. Perform mandibular opening without lateral deviation to increase ease with chewing food. IE: right lateral deviation  Current: mild right lateral deviation upon mandibular closing; no change 11/23/20   2. Increase scapular strength B to 4/5 to improve overall stability for postural awareness. IE: 3/5  Current: grossly 3+/5   3.  Patient will report >50% improvement with pain to increase ease with talking and   IE: 0%  Current: 50% improvement      PLAN  [x]  Upgrade activities as tolerated     []  Continue plan of care  []  Update interventions per flow sheet       []  Discharge due to:_  []  Other:_      Ludwig Iyer DPT 11/25/2020  10:21 AM    Future Appointments   Date Time Provider Rory Elliott   12/1/2020 12:45 PM Gregorio MARIO, PT [facility]   12/3/2020  2:20 PM Deepika NGUYEN NP [facility] BS AM[facility]   12/4/2020  1:30 PM Emelina Jones, PT [facility]   12/8/2020  3:00 PM Emelina Jones, PT [facility]   12/11/2020  3:00 PM Emelina Jones PT [facility]   12/15/2020  3:00 PM Emelina Jones, PT [facility]   12/18/2020  1:00 PM MIGUEL Estrada BS AM[facility]   12/18/2020  3:00 PM Emelina Jones, PT [facility]

## 2020-12-01 ENCOUNTER — HOSPITAL ENCOUNTER (OUTPATIENT)
Dept: PHYSICAL THERAPY | Age: 25
Discharge: HOME | End: 2020-12-01
Payer: COMMERCIAL

## 2020-12-01 PROCEDURE — 97035 APP MDLTY 1+ULTRASOUND EA 15: CPT

## 2020-12-01 PROCEDURE — 97110 THERAPEUTIC EXERCISES: CPT

## 2020-12-01 PROCEDURE — 97112 NEUROMUSCULAR REEDUCATION: CPT

## 2020-12-01 NOTE — PROGRESS NOTES
PT DAILY TREATMENT NOTE 10-18    Patient Name: Chestine Riedel  Date:2020  : 1995  [x]  Patient  Verified  Payor: [address] / Plan: Ritchie Gross / Product Type: Managed Care Medicaid /    In time:1245  Out time:126  Total Treatment Time (min): 41  Visit #: 3 of -8  Treatment Area: TMJ dysfunction [M26.609]    SUBJECTIVE  Pain Level (0-10 scale): 3  Any medication changes, allergies to medications, adverse drug reactions, diagnosis change, or new procedure performed?: [x] No    [] Yes (see summary sheet for update)  Subjective functional status/changes:   [] No changes reported  It's really bothering me. I don't know what's going on. I'm going back to there oral surgeon. I think a nerve is getting pinched.      OBJECTIVE    Modality rationale: decrease pain to improve the patients ability to tolerate eating and chewing   Min Type Additional Details    [] Estim:  []Unatt       []IFC  []Premod                        []Other:  []w/ice   []w/heat  Position:  Location:    [] Estim: []Att    []TENS instruct  []NMES                    []Other:  []w/US   []w/ice   []w/heat  Position:  Location:    []  Traction: [] Cervical       []Lumbar                       [] Prone          []Supine                       []Intermittent   []Continuous Lbs:  [] before manual  [] after manual   8 with setup [x]  Ultrasound: [x]Continuous   [] Pulsed                           [x]1MHz   []3MHz W/cm2:1.0  Location:left masseter    []  Iontophoresis with dexamethasone         Location: [] Take home patch   [] In clinic    []  Ice     []  heat  []  Ice massage  []  Laser   []  Anodyne Position:  Location:    []  Laser with stim  []  Other:  Position:  Location:    []  Vasopneumatic Device Pressure:       [] lo [] med [] hi   Temperature: [] lo [] med [] hi   [] Skin assessment post-treatment:  []intact []redness- no adverse reaction    []redness  adverse reaction:       15 min Therapeutic Exercise: [] See flow sheet :   Rationale: increase ROM and increase strength to improve the patients ability to perform daily activities      13 min Neuromuscular Re-education:  []  See flow sheet :   Rationale: increase strength, improve coordination and increase proprioception  to improve the patients postural awareness    5 min Manual Therapy:  STM left masseter, SCM, scalenes; grade 4 upper T/S mobs   The manual therapy interventions were performed at a separate and distinct time from the therapeutic activities interventions. Rationale: decrease pain, increase ROM and increase tissue extensibility to improve overall postural awareness  With   [] TE   [] TA   [] neuro   [] other: Patient Education: [x] Review HEP    [] Progressed/Changed HEP based on:   [] positioning   [] body mechanics   [] transfers   [] heat/ice application    [] other:      Other Objective/Functional Measures:      Pain Level (0-10 scale) post treatment: 1-2    ASSESSMENT/Changes in Function: Extremely slow progress. Patient responds well to therapy, but benefits are only short-term. Will most likely D/C in 3 visits. Patient will continue to benefit from skilled PT services to modify and progress therapeutic interventions, address functional mobility deficits, address strength deficits, analyze and address soft tissue restrictions, analyze and cue movement patterns and assess and modify postural abnormalities to attain remaining goals. []  See Plan of Care  []  See progress note/recertification  []  See Discharge Summary         Progress towards goals / Updated goals:  1. Perform mandibular opening without lateral deviation to increase ease with chewing food. IE: right lateral deviation  Current: mild right lateral deviation upon mandibular closing; no change 11/23/20   2. Increase scapular strength B to 4/5 to improve overall stability for postural awareness. IE: 3/5  Current: grossly 3+/5   3.  Patient will report >50% improvement with pain to increase ease with talking and   IE: 0%  Current: 50% improvement      PLAN  []  Upgrade activities as tolerated     [x]  Continue plan of care  []  Update interventions per flow sheet       []  Discharge due to:_  []  Other:_      Kristina Cleveland, MPT, CMTPT 12/1/2020  8:29 AM    Future Appointments   Date Time Provider Rory Elliott   12/1/2020 12:45 PM Sampson MARIO, PT [facility]   12/4/2020  1:30 PM Rin Edwards, PT [facility]   12/8/2020  3:00 PM Rin Edwards, PT [facility]   12/11/2020  3:00 PM Rin Edwards, PT [facility]   12/15/2020  3:00 PM Rin Edwards, PT [facility]   12/18/2020  1:00 PM Mounika Hartman NP [facility] BS AMB   12/18/2020  3:00 PM Rin Edwards, PT [facility]

## 2020-12-03 NOTE — TELEPHONE ENCOUNTER
This patient contacted office for the following prescriptions to be filled:    Medication requested :   Requested Prescriptions     Pending Prescriptions Disp Refills    methocarbamoL (ROBAXIN) 500 mg tablet 60 Tab 1     Sig: Take 2 Tabs by mouth four (4) times daily as needed for Muscle Spasm(s).        PCP: [address] [place] [address] or Print: [facility]   Mail order or Local pharmacy [facility]   Scheduled appointment if not seen by current providers in office: LOV 11/19/2020 f/u 12/18/2020 appt for 12/3/2020 was cancelled due to provider out of the office

## 2020-12-04 ENCOUNTER — HOSPITAL ENCOUNTER (OUTPATIENT)
Dept: PHYSICAL THERAPY | Age: 25
Discharge: HOME | End: 2020-12-04
Payer: COMMERCIAL

## 2020-12-04 PROCEDURE — 97035 APP MDLTY 1+ULTRASOUND EA 15: CPT

## 2020-12-04 PROCEDURE — 97110 THERAPEUTIC EXERCISES: CPT

## 2020-12-04 PROCEDURE — 97112 NEUROMUSCULAR REEDUCATION: CPT

## 2020-12-04 NOTE — TELEPHONE ENCOUNTER
Last Visit: 11/19/20 with NP Sj Schwartz  Next Appointment: 12/18/20 with MIGUEL Schwartz  Previous Refill Encounter(s): 11/19/20 #60 with 1 refill    Requested Prescriptions     Pending Prescriptions Disp Refills    methocarbamoL (ROBAXIN) 500 mg tablet 60 Tab 1     Sig: Take 2 Tabs by mouth four (4) times daily as needed for Muscle Spasm(s).

## 2020-12-04 NOTE — PROGRESS NOTES
PT DAILY TREATMENT NOTE 10-18    Patient Name: Chestine Riedel  Date:2020  : 1995  [x]  Patient  Verified  Payor: [address] [place] [address] Road / Plan: [address] / Product Type: Managed Care Medicaid /    In time:133  Out time:216  Total Treatment Time (min): 43  Visit #: 4 of 6-8  Treatment Area: TMJ dysfunction [M26.609]    SUBJECTIVE  Pain Level (0-10 scale):  2  Any medication changes, allergies to medications, adverse drug reactions, diagnosis change, or new procedure performed?: [x] No    [] Yes (see summary sheet for update)  Subjective functional status/changes:   [x] No changes reported      OBJECTIVE    Modality rationale: decrease pain and increase tissue extensibility to improve the patients ability to eat and chew   Min Type Additional Details    [] Estim:  []Unatt       []IFC  []Premod                        []Other:  []w/ice   []w/heat  Position:  Location:    [] Estim: []Att    []TENS instruct  []NMES                    []Other:  []w/US   []w/ice   []w/heat  Position:  Location:    []  Traction: [] Cervical       []Lumbar                       [] Prone          []Supine                       []Intermittent   []Continuous Lbs:  [] before manual  [] after manual   8 with setup [x]  Ultrasound: [x]Continuous   [] Pulsed                           [x]1MHz   []3MHz W/cm2:1.0  Location:left masseter    []  Iontophoresis with dexamethasone         Location: [] Take home patch   [] In clinic    []  Ice     []  heat  []  Ice massage  []  Laser   []  Anodyne Position:  Location:    []  Laser with stim  []  Other:  Position:  Location:    []  Vasopneumatic Device Pressure:       [] lo [] med [] hi   Temperature: [] lo [] med [] hi   [] Skin assessment post-treatment:  []intact []redness- no adverse reaction    []redness  adverse reaction:       20 min Therapeutic Exercise:  [] See flow sheet :   Rationale: increase ROM and increase strength to improve the patients ability to perform daily activities      15 min Neuromuscular Re-education:  []  See flow sheet :   Rationale: increase strength, improve coordination, improve balance and increase proprioception  to improve the patients ability to recruit ant and post chain appropriately     3 min Manual Therapy:  SOR; STM/MFR left SCM, scalenes, masseter   The manual therapy interventions were performed at a separate and distinct time from the therapeutic activities interventions. Rationale: decrease pain, increase ROM and increase tissue extensibility to eat and chew without pain  With   [] TE   [] TA   [] neuro   [] other: Patient Education: [x] Review HEP    [] Progressed/Changed HEP based on:   [] positioning   [] body mechanics   [] transfers   [] heat/ice application    [] other:      Other Objective/Functional Measures:      Pain Level (0-10 scale) post treatment: 0    ASSESSMENT/Changes in Function: Minimal change since last treatment with little carry over. Will most likely D/C soon. Patient will continue to benefit from skilled PT services to modify and progress therapeutic interventions, address functional mobility deficits, address ROM deficits, address strength deficits, analyze and address soft tissue restrictions, analyze and cue movement patterns and assess and modify postural abnormalities to attain remaining goals. []  See Plan of Care  []  See progress note/recertification  []  See Discharge Summary         Progress towards goals / Updated goals:  1. Perform mandibular opening without lateral deviation to increase ease with chewing food. IE: right lateral deviation  Current: mild right lateral deviation upon mandibular closing; no change 11/23/20   2. Increase scapular strength B to 4/5 to improve overall stability for postural awareness. IE: 3/5  Current: grossly 3+/5   3.  Patient will report >50% improvement with pain to increase ease with talking and   IE: 0%  Current: 50% improvement 12/4/20      PLAN  []  Upgrade activities as tolerated     [x]  Continue plan of care  []  Update interventions per flow sheet       []  Discharge due to:_  []  Other:_      Trisha Comsb, MPT, CMTPT 12/4/2020  9:00 AM    Future Appointments   Date Time Provider Rory Elliott   12/4/2020  1:30 PM Mani MARIO, PT [facility]   12/8/2020  3:00 PM Camillo Jackson, PT [facility]   12/11/2020  3:00 PM Camillo Jackson, PT [facility]   12/15/2020  3:00 PM Camillo Jackson, PT [facility]   12/18/2020  1:00 PM Bridget Elaine, NP [facility] BS AMB   12/18/2020  3:00 PM Camillo Bud, PT [facility]

## 2020-12-07 RX ORDER — METHOCARBAMOL 500 MG/1
1000 TABLET, FILM COATED ORAL
Qty: 60 TAB | Refills: 1 | Status: SHIPPED | OUTPATIENT
Start: 2020-12-07 | End: 2021-01-07 | Stop reason: SDUPTHER

## 2020-12-08 ENCOUNTER — HOSPITAL ENCOUNTER (OUTPATIENT)
Dept: PHYSICAL THERAPY | Age: 25
Discharge: HOME | End: 2020-12-08
Payer: COMMERCIAL

## 2020-12-08 PROCEDURE — 97035 APP MDLTY 1+ULTRASOUND EA 15: CPT

## 2020-12-08 PROCEDURE — 97112 NEUROMUSCULAR REEDUCATION: CPT

## 2020-12-08 PROCEDURE — 97110 THERAPEUTIC EXERCISES: CPT

## 2020-12-08 NOTE — PROGRESS NOTES
PT DAILY TREATMENT NOTE 10-18    Patient Name: Wesley Loo  Date:2020  : 1995  [x]  Patient  Verified  Payor: [address] [place] [address] Road / Plan: [address] / Product Type: Managed Care Medicaid /    In time:300  Out time:342  Total Treatment Time (min): 42  Visit #: 5 of -      Treatment Area: TMJ dysfunction [M26.609]    SUBJECTIVE  Pain Level (0-10 scale):  2  Any medication changes, allergies to medications, adverse drug reactions, diagnosis change, or new procedure performed?: [x] No    [] Yes (see summary sheet for update)  Subjective functional status/changes:   [] No changes reported  No change    OBJECTIVE    Modality rationale: decrease pain to improve the patients ability to eat and chew   Min Type Additional Details    [] Estim:  []Unatt       []IFC  []Premod                        []Other:  []w/ice   []w/heat  Position:  Location:    [] Estim: []Att    []TENS instruct  []NMES                    []Other:  []w/US   []w/ice   []w/heat  Position:  Location:    []  Traction: [] Cervical       []Lumbar                       [] Prone          []Supine                       []Intermittent   []Continuous Lbs:  [] before manual  [] after manual   8 with setup [x]  Ultrasound: [x]Continuous   [] Pulsed                           [x]1MHz   []3MHz W/cm2:1.0  Location:left masseter    []  Iontophoresis with dexamethasone         Location: [] Take home patch   [] In clinic    []  Ice     []  heat  []  Ice massage  []  Laser   []  Anodyne Position:  Location:    []  Laser with stim  []  Other:  Position:  Location:    []  Vasopneumatic Device Pressure:       [] lo [] med [] hi   Temperature: [] lo [] med [] hi   [] Skin assessment post-treatment:  []intact []redness- no adverse reaction        19 min Therapeutic Exercise:  [] See flow sheet :   Rationale: increase ROM and increase strength to improve the patients ability to perform daily activities      12 min Neuromuscular Re-education:  []  See flow sheet :   Rationale: increase strength  to improve the patients ability to perform daily activities     3 min Manual Therapy:   SOR; STM/MFR left SCM, scalenes, masseter   The manual therapy interventions were performed at a separate and distinct time from the therapeutic activities interventions. Rationale: decrease pain, increase ROM, increase tissue extensibility and decrease trigger points to eat and chew without pain  With   [] TE   [] TA   [] neuro   [] other: Patient Education: [x] Review HEP    [] Progressed/Changed HEP based on:   [] positioning   [] body mechanics   [] transfers   [] heat/ice application    [] other:      Other Objective/Functional Measures:      Pain Level (0-10 scale) post treatment: 0    ASSESSMENT/Changes in Function: Minimal improvement since progress note. D/C next visit. Patient will continue to benefit from skilled PT services to modify and progress therapeutic interventions, address strength deficits and analyze and address soft tissue restrictions to attain remaining goals. []  See Plan of Care  []  See progress note/recertification  []  See Discharge Summary         Progress towards goals / Updated goals:  1. Perform mandibular opening without lateral deviation to increase ease with chewing food. IE: right lateral deviation  Current: mild right lateral deviation upon mandibular closing; no change 11/23/20; deviation present    2. Increase scapular strength B to 4/5 to improve overall stability for postural awareness. IE: 3/5  Current: grossly 3+/5   3.  Patient will report >50% improvement with pain to increase ease with talking and   IE: 0%  Current: 50% improvement 12/4/20      PLAN  []  Upgrade activities as tolerated     [x]  Continue plan of care  []  Update interventions per flow sheet       []  Discharge due to:_  []  Other:_      Phil Baumgarten, KAVITA, CMTPT 12/8/2020  10:22 AM    Future Appointments   Date Time Provider Rory Elliott 12/8/2020  3:00 PM [facility], PT [facility]   12/11/2020  3:00 PM Mal Pekin, PT [facility]   12/15/2020  3:00 PM Mal Pekin, PT [facility]   12/18/2020  1:00 PM Ashley Orlando, MIGUEL [facility] BS AMB   12/18/2020  3:00 PM Mal Pekin, PT [facility]

## 2020-12-11 ENCOUNTER — HOSPITAL ENCOUNTER (OUTPATIENT)
Dept: PHYSICAL THERAPY | Age: 25
Discharge: HOME | End: 2020-12-11
Payer: COMMERCIAL

## 2020-12-11 PROCEDURE — 97035 APP MDLTY 1+ULTRASOUND EA 15: CPT

## 2020-12-11 PROCEDURE — 97110 THERAPEUTIC EXERCISES: CPT

## 2020-12-11 PROCEDURE — 97535 SELF CARE MNGMENT TRAINING: CPT

## 2020-12-11 NOTE — PROGRESS NOTES
[facility] DISCHARGE DAILY NOTE AND [id number]    Patient name: Sunshine Elam Start of Care: 10/9/20   Referral source: Cali Giron NP : 1995   Medical/Treatment Diagnosis: TMJ dysfunction [M26.609] Onset Date:May 2020     Prior Hospitalization: see medical history Provider#: [id number]   Medications: Verified on Patient Summary List    Comorbidities: none reported  Prior Level of Function: Able to talk, chew, eat without pain  Visits from Start of Care: 14    Missed Visits: 0    Reporting Period : 20 to 20    Date:2020  : 1995  [x]  Patient  Verified  Payor: [address] [place] [address] Road / Plan: [address] / Product Type: Managed Care Medicaid /    In time:258  Out time:329  Total Treatment Time (min): 31  Visit #: 6 of 6-8      SUBJECTIVE  Pain Level (0-10 scale): 3  Any medication changes, allergies to medications, adverse drug reactions, diagnosis change, or new procedure performed?: [x] No    [] Yes (see summary sheet for update)  Subjective functional status/changes:   [x] No changes reported      OBJECTIVE    Modality rationale: decrease pain to improve the patients ability to eat and chew without pain   Min Type Additional Details    [] Estim:  []Unatt       []IFC  []Premod                        []Other:  []w/ice   []w/heat  Position:  Location:    [] Estim: []Att    []TENS instruct  []NMES                    []Other:  []w/US   []w/ice   []w/heat  Position:  Location:    []  Traction: [] Cervical       []Lumbar                       [] Prone          []Supine                       []Intermittent   []Continuous Lbs:  [] before manual  [] after manual   8 with setup [x]  Ultrasound: [x]Continuous   [] Pulsed                           [x]1MHz   []3MHz W/cm2:1.0  Location:left masseter    []  Iontophoresis with dexamethasone         Location: [] Take home patch   [] In clinic    []  Ice     []  heat  []  Ice massage  []  Laser   []  Anodyne Position:  Location:    [] Laser with stim  []  Other:  Position:  Location:    []  Vasopneumatic Device Pressure:       [] lo [] med [] hi   Temperature: [] lo [] med [] hi   [] Skin assessment post-treatment:  []intact []redness- no adverse reaction    []redness  adverse reaction:     10 min Therapeutic Exercise:  [] See flow sheet :   Rationale: increase ROM and increase strength to improve the patients ability to eat and chew without pain  Self-care: 15'          With   [] TE   [] TA   [] neuro   [] other: Patient Education: [x] Review HEP    [] Progressed/Changed HEP based on:   [] positioning   [] body mechanics   [] transfers   [] heat/ice application    [] other:      Other Objective/Functional Measures:      Pain Level (0-10 scale) post treatment: 0    Summary of Care:  1. Perform mandibular opening without lateral deviation to increase ease with chewing food. IE: right lateral deviation  Current:trace amount of deviation- progressing  2. Increase scapular strength B to 4/5 to improve overall stability for postural awareness. IE: 3/5  Current: B MT 4/5; LT 4-5- partially met  3. Patient will report >50% improvement with pain to increase ease with talking and   IE: 0%  Current: 55% improvement        ASSESSMENT/Changes in Function: Patient has essentially met all goals and has reached a plateau with therapy. Patient has been discharged to a [facility] for maintenance. Thank you for this referral!  ASSESSMENT/RECOMMENDATIONS:  [x]Discontinue therapy: [x]Patient has reached or is progressing toward set goals      []Patient is non-compliant or has abdicated      []Due to lack of appreciable progress towards set Ul. Nash Clifton, PT 12/11/2020 3:35 PM    NOTE TO PHYSICIAN:  Please complete the following and fax to: [facility] at [phone]  . Retain this original for your records. If you are unable to process this request in   24 hours, please contact our office.      [] I have read the above report and request that my patient continue therapy with the following changes/special instructions:  [] I have read the above report and request that my patient be discharged from therapy    Physician's Signature:____________Date:_________TIME:________    ** Signature, Date and Time must be completed for valid certification **              Yeison Barnes, PT 12/11/2020  8:53 AM

## 2020-12-11 NOTE — PROGRESS NOTES
Physical Therapy Discharge Instructions      [facility]  [address]  [facility], [address]  [phone] fax    Patient: Matt Herrera  : 1995      Continue Home Exercise Program 1-2 times per day for 8 weeks, then decrease to 1 times per week      Continue with    [] Ice  as needed  times per day     [] Heat           Follow up with MD:     [] Upon completion of therapy     [x] As needed      Recommendations:     [x]   Return to activity with home program    []   Return to activity with the following modifications:       []Post Rehab Program    []Join Independent aquatic program     []Return to/join local gym        Additional Comments:          Lexx Amaya, PT 2020 3:37 PM

## 2020-12-15 ENCOUNTER — APPOINTMENT (OUTPATIENT)
Dept: PHYSICAL THERAPY | Age: 25
End: 2020-12-15
Payer: COMMERCIAL

## 2020-12-18 ENCOUNTER — VIRTUAL VISIT (OUTPATIENT)
Dept: FAMILY MEDICINE CLINIC | Age: 25
End: 2020-12-18
Payer: COMMERCIAL

## 2020-12-18 ENCOUNTER — APPOINTMENT (OUTPATIENT)
Dept: PHYSICAL THERAPY | Age: 25
End: 2020-12-18
Payer: COMMERCIAL

## 2020-12-18 DIAGNOSIS — M26.622 TMJ TENDERNESS, LEFT: Primary | ICD-10-CM

## 2020-12-18 DIAGNOSIS — Z86.59 HISTORY OF CLAUSTROPHOBIA: ICD-10-CM

## 2020-12-18 PROCEDURE — 99214 OFFICE O/P EST MOD 30 MIN: CPT | Performed by: NURSE PRACTITIONER

## 2020-12-18 RX ORDER — ERYTHROMYCIN 5 MG/G
OINTMENT OPHTHALMIC
COMMUNITY
Start: 2020-12-14 | End: 2021-04-13 | Stop reason: ALTCHOICE

## 2020-12-18 RX ORDER — AMOXICILLIN AND CLAVULANATE POTASSIUM 500; 125 MG/1; MG/1
TABLET, FILM COATED ORAL
COMMUNITY
Start: 2020-12-14 | End: 2021-04-13 | Stop reason: ALTCHOICE

## 2020-12-18 RX ORDER — DIAZEPAM 5 MG/1
5 TABLET ORAL ONCE
Qty: 1 TAB | Refills: 0 | Status: SHIPPED | OUTPATIENT
Start: 2020-12-18 | End: 2020-12-18

## 2020-12-18 RX ORDER — HYDROCODONE BITARTRATE AND IBUPROFEN 7.5; 2 MG/1; MG/1
1 TABLET, FILM COATED ORAL
Qty: 12 TAB | Refills: 0 | Status: SHIPPED | OUTPATIENT
Start: 2020-12-18 | End: 2021-01-07 | Stop reason: SDUPTHER

## 2020-12-18 NOTE — PROGRESS NOTES
Maxim Fritz is a 23 y.o. female who was seen by synchronous (real-time) audio-video technology on 12/18/2020 for Anxiety        Assessment & Plan:   Diagnoses and all orders for this visit:    1. TMJ tenderness, left  -     MRI TMJ JT LT; Future  -     HYDROcodone-ibuprofen (VICOPROFEN) 7.5-200 mg per tablet; Take 1 Tab by mouth every six (6) hours as needed for Pain for up to 3 days. Max Daily Amount: 4 Tabs. 2. History of claustrophobia  -     diazePAM (VALIUM) 5 mg tablet; Take 1 Tab by mouth once for 1 dose. Max Daily Amount: 5 mg. 30 mins prior to MRI      Follow-up and Dispositions    · Return in about 3 weeks (around 1/8/2021) for TMJ, in office follow up. I spent at least 20 minutes on this visit with this established patient. 712  Subjective:   Patient states she was speaking with her PT and informed she reached a 'deadend' with her therapy. States the PT exercises she learned is helping some when she has pain. Mother states when she had severe pain to where she has to go to the ED but mother has been getting stronger pain medication from family members for that day ie: percocet, Vicodin. Patient was previously prescribed Valium in the ED for severe pain which did help. States she does talk a lot for her job. Patient states her PT therapist advised she may have possible nerve damage. Prior to Admission medications    Medication Sig Start Date End Date Taking? Authorizing Provider   methocarbamoL (ROBAXIN) 500 mg tablet Take 2 Tabs by mouth four (4) times daily as needed for Muscle Spasm(s). 12/7/20   Triny NGUYEN NP   busPIRone (BUSPAR) 5 mg tablet Take 1 Tab by mouth three (3) times daily (with meals). 11/19/20   Triny NGUYEN NP   ibuprofen (MOTRIN) 600 mg tablet Take 600 mg by mouth every six (6) hours as needed.  1/20/20   Provider, Historical   predniSONE (STERAPRED DS) 10 mg dose pack See administration instruction per 10mg dose pack 9/28/20   Indu Cramer NP   ibuprofen (ADVIL;MOTRIN) 100 mg/5 mL suspension Take 40 mL by mouth three (3) times daily as needed (pain, take with food). 9/28/20   Cali Giron NP     There is no problem list on file for this patient. There are no active problems to display for this patient. Current Outpatient Medications   Medication Sig Dispense Refill    methocarbamoL (ROBAXIN) 500 mg tablet Take 2 Tabs by mouth four (4) times daily as needed for Muscle Spasm(s). 60 Tab 1    busPIRone (BUSPAR) 5 mg tablet Take 1 Tab by mouth three (3) times daily (with meals). 90 Tab 2    ibuprofen (MOTRIN) 600 mg tablet Take 600 mg by mouth every six (6) hours as needed.  predniSONE (STERAPRED DS) 10 mg dose pack See administration instruction per 10mg dose pack 21 Tab 0    ibuprofen (ADVIL;MOTRIN) 100 mg/5 mL suspension Take 40 mL by mouth three (3) times daily as needed (pain, take with food). 473 mL 1     No Known Allergies  No past medical history on file. No past surgical history on file. No family history on file. Social History     Tobacco Use    Smoking status: Never Smoker   Substance Use Topics    Alcohol use: No     Review of Systems   Constitutional: Negative for chills and fever. Respiratory: Negative for shortness of breath. Cardiovascular: Negative for chest pain and palpitations. Neurological: Negative for dizziness and headaches. Objective:   No flowsheet data found. General: alert, cooperative, no distress   Mental  status: normal mood, behavior, speech, dress, motor activity, and thought processes, able to follow commands   HENT: NCAT   Neck: no visualized mass   Resp: no respiratory distress   Neuro: no gross deficits   Skin: no discoloration or lesions of concern on visible areas   Psychiatric: normal affect, consistent with stated mood, no evidence of hallucinations     Additional exam findings: We discussed the expected course, resolution and complications of the diagnosis(es) in detail.   Medication risks, benefits, costs, interactions, and alternatives were discussed as indicated. I advised her to contact the office if her condition worsens, changes or fails to improve as anticipated. She expressed understanding with the diagnosis(es) and plan. Wilber Bolaños, who was evaluated through a patient-initiated, synchronous (real-time) audio-video encounter, and/or her healthcare decision maker, is aware that it is a billable service, with coverage as determined by her insurance carrier. She provided verbal consent to proceed: Yes, and patient identification was verified. It was conducted pursuant to the emergency declaration under the [address] [place] [address], [address] [place] [address] authority and the [employer] and [employer] General Act. A caregiver was present when appropriate. Ability to conduct physical exam was limited. I was in the office. The patient was at home.       Dimas Cameron NP

## 2021-01-04 ENCOUNTER — HOSPITAL ENCOUNTER (OUTPATIENT)
Dept: MRI IMAGING | Age: 26
Discharge: HOME | End: 2021-01-04
Attending: NURSE PRACTITIONER
Payer: MEDICAID

## 2021-01-04 DIAGNOSIS — M26.622 TMJ TENDERNESS, LEFT: ICD-10-CM

## 2021-01-04 PROCEDURE — 70336 MAGNETIC IMAGE JAW JOINT: CPT

## 2021-01-07 ENCOUNTER — VIRTUAL VISIT (OUTPATIENT)
Dept: FAMILY MEDICINE CLINIC | Age: 26
End: 2021-01-07
Payer: MEDICAID

## 2021-01-07 DIAGNOSIS — M26.609 TMJ DYSFUNCTION: Primary | ICD-10-CM

## 2021-01-07 DIAGNOSIS — M26.622 TMJ TENDERNESS, LEFT: ICD-10-CM

## 2021-01-07 PROCEDURE — 99212 OFFICE O/P EST SF 10 MIN: CPT | Performed by: NURSE PRACTITIONER

## 2021-01-07 RX ORDER — METHOCARBAMOL 500 MG/1
1000 TABLET, FILM COATED ORAL
Qty: 60 TAB | Refills: 1 | Status: SHIPPED | OUTPATIENT
Start: 2021-01-07 | End: 2021-02-02

## 2021-01-07 RX ORDER — HYDROCODONE BITARTRATE AND IBUPROFEN 7.5; 2 MG/1; MG/1
1 TABLET, FILM COATED ORAL
Qty: 20 TAB | Refills: 0 | Status: SHIPPED | OUTPATIENT
Start: 2021-01-07 | End: 2021-01-12

## 2021-01-07 NOTE — PROGRESS NOTES
Jerrod Alfaro is a 23 y.o. female who was seen by synchronous (real-time) audio-video technology on 1/7/2021 for No chief complaint on file. Assessment & Plan:   Diagnoses and all orders for this visit:    1. TMJ dysfunction  -     REFERRAL TO DENTISTRY    2. TMJ tenderness, left  -     HYDROcodone-ibuprofen (VICOPROFEN) 7.5-200 mg per tablet; Take 1 Tab by mouth every six (6) hours as needed for Pain for up to 5 days. Max Daily Amount: 4 Tabs. Other orders  -     methocarbamoL (ROBAXIN) 500 mg tablet; Take 2 Tabs by mouth four (4) times daily as needed for Muscle Spasm(s). Follow-up and Dispositions    · Return in about 3 months (around 4/7/2021) for anxiety, in office follow up. Routing History        I spent at least 17 minutes on this visit with this established patient. 712  Subjective:   Patient states she continues to have pain. Comments pain is worse with her mouth close. Reviewed MRI results. Requesting refill on Vicoprofen advised this will be only refill. Patient verbalizes understanding. Prior to Admission medications    Medication Sig Start Date End Date Taking? Authorizing Provider   amoxicillin-clavulanate (AUGMENTIN) 500-125 mg per tablet  12/14/20   Provider, Historical   erythromycin (ILOTYCIN) ophthalmic ointment  12/14/20   Provider, Historical   methocarbamoL (ROBAXIN) 500 mg tablet Take 2 Tabs by mouth four (4) times daily as needed for Muscle Spasm(s). 12/7/20   Jared NGUYEN NP   busPIRone (BUSPAR) 5 mg tablet Take 1 Tab by mouth three (3) times daily (with meals). 11/19/20   Jared NGUYEN NP   ibuprofen (MOTRIN) 600 mg tablet Take 600 mg by mouth every six (6) hours as needed. 1/20/20   Provider, Historical   predniSONE (STERAPRED DS) 10 mg dose pack See administration instruction per 10mg dose pack 9/28/20   Jared NGUYEN NP   ibuprofen (ADVIL;MOTRIN) 100 mg/5 mL suspension Take 40 mL by mouth three (3) times daily as needed (pain, take with food).  9/28/20 Isrrael Driver NP     There is no problem list on file for this patient. There are no active problems to display for this patient. Current Outpatient Medications   Medication Sig Dispense Refill    methocarbamoL (ROBAXIN) 500 mg tablet Take 2 Tabs by mouth four (4) times daily as needed for Muscle Spasm(s). 60 Tab 1    HYDROcodone-ibuprofen (VICOPROFEN) 7.5-200 mg per tablet Take 1 Tab by mouth every six (6) hours as needed for Pain for up to 5 days. Max Daily Amount: 4 Tabs. 20 Tab 0    amoxicillin-clavulanate (AUGMENTIN) 500-125 mg per tablet       erythromycin (ILOTYCIN) ophthalmic ointment       busPIRone (BUSPAR) 5 mg tablet Take 1 Tab by mouth three (3) times daily (with meals). 90 Tab 2    predniSONE (STERAPRED DS) 10 mg dose pack See administration instruction per 10mg dose pack 21 Tab 0     No Known Allergies  No past medical history on file. No past surgical history on file. No family history on file. Social History     Tobacco Use    Smoking status: Never Smoker   Substance Use Topics    Alcohol use: No       ROS    Objective:   No flowsheet data found. General: alert, cooperative, no distress   Mental  status: normal mood, behavior, speech, dress, motor activity, and thought processes, able to follow commands   HENT: NCAT   Neck: no visualized mass   Resp: no respiratory distress   Neuro: no gross deficits   Skin: no discoloration or lesions of concern on visible areas   Psychiatric: normal affect, consistent with stated mood, no evidence of hallucinations     Additional exam findings: We discussed the expected course, resolution and complications of the diagnosis(es) in detail. Medication risks, benefits, costs, interactions, and alternatives were discussed as indicated. I advised her to contact the office if her condition worsens, changes or fails to improve as anticipated. She expressed understanding with the diagnosis(es) and plan.        Jayden Padilla, who was evaluated through a patient-initiated, synchronous (real-time) audio-video encounter, and/or her healthcare decision maker, is aware that it is a billable service, with coverage as determined by her insurance carrier. She provided verbal consent to proceed: Yes, and patient identification was verified. It was conducted pursuant to the emergency declaration under the [address] [place] [address], [address] [place] [address] and the [employer] and [employer] Act. A caregiver was present when appropriate. Ability to conduct physical exam was limited. I was in the office. The patient was at home.       Adelina Rodriguez NP

## 2021-02-02 RX ORDER — BUSPIRONE HYDROCHLORIDE 5 MG/1
TABLET ORAL
Qty: 90 TAB | Refills: 2 | Status: SHIPPED | OUTPATIENT
Start: 2021-02-02 | End: 2021-07-08

## 2021-02-02 RX ORDER — METHOCARBAMOL 500 MG/1
TABLET, FILM COATED ORAL
Qty: 60 TAB | Refills: 1 | Status: SHIPPED | OUTPATIENT
Start: 2021-02-02 | End: 2021-04-13 | Stop reason: SDUPTHER

## 2021-02-24 RX ORDER — BUSPIRONE HYDROCHLORIDE 5 MG/1
TABLET ORAL
Qty: 90 TAB | Refills: 2 | Status: CANCELLED | OUTPATIENT
Start: 2021-02-24

## 2021-02-24 NOTE — TELEPHONE ENCOUNTER
Patient still has refills at the pharmacy for Buspar. I contacted the pharmacy to confirm and requested they refill it for the patient. No further action needed.

## 2021-04-13 ENCOUNTER — OFFICE VISIT (OUTPATIENT)
Dept: FAMILY MEDICINE CLINIC | Age: 26
End: 2021-04-13
Payer: MEDICAID

## 2021-04-13 VITALS
HEART RATE: 69 BPM | HEIGHT: 59 IN | WEIGHT: 118 LBS | TEMPERATURE: 98 F | OXYGEN SATURATION: 98 % | SYSTOLIC BLOOD PRESSURE: 112 MMHG | BODY MASS INDEX: 23.79 KG/M2 | DIASTOLIC BLOOD PRESSURE: 69 MMHG | RESPIRATION RATE: 18 BRPM

## 2021-04-13 DIAGNOSIS — F32.A MILD DEPRESSION: ICD-10-CM

## 2021-04-13 DIAGNOSIS — F41.9 MILD ANXIETY: Primary | ICD-10-CM

## 2021-04-13 DIAGNOSIS — Z87.42 HISTORY OF VAGINITIS: ICD-10-CM

## 2021-04-13 PROCEDURE — 99213 OFFICE O/P EST LOW 20 MIN: CPT | Performed by: NURSE PRACTITIONER

## 2021-04-13 RX ORDER — FLUCONAZOLE 150 MG/1
150 TABLET ORAL
Qty: 2 TAB | Refills: 0 | Status: SHIPPED | OUTPATIENT
Start: 2021-04-13 | End: 2021-04-21

## 2021-04-13 RX ORDER — METHOCARBAMOL 500 MG/1
TABLET, FILM COATED ORAL
Qty: 60 TAB | Refills: 1 | Status: SHIPPED | OUTPATIENT
Start: 2021-04-13 | End: 2021-11-02 | Stop reason: ALTCHOICE

## 2021-04-13 NOTE — PROGRESS NOTES
Bonnie Fatima is a 23 y.o. female who was seen in clinic today (4/13/2021) for Anxiety    Assessment & Plan:   Diagnoses and all orders for this visit:    1. Mild anxiety    2. Mild depression ([address]    3. History of vaginitis    Other orders  -     methocarbamoL (ROBAXIN) 500 mg tablet; take 2 tablets by mouth four times a day if needed for muscle spasm  -     fluconazole (DIFLUCAN) 150 mg tablet; Take 1 Tab by mouth every seven (7) days for 2 doses. Above stable continue current treatment plan  informed if vaginal candidiasis symptoms do not improve will need to return for pelvic exam.  Patient agreeable and verbalizes understanding. I have discussed the diagnosis with the patient and the intended plan as seen in the above orders. The patient has received an after-visit summary and questions were answered concerning future plans. I have discussed medication side effects and warnings with the patient as well. Patient agreeable with above plan and verbalizes understanding. Follow-up and Dispositions    · Return in about 6 months (around 10/13/2021) for anxiety/depression, in office follow up. Subjective:   Patient states her anxiety has been controlled with buspar. Comments she is currently under the care of TMJ specialist.  Reports she is now wearing a mouth guard which does help with symptoms. Patient states she has been experiencing vaginal itching, cottage discharge which have been present for the last 5 days. comments she tends to get a yeast infection prior to her menstrual cycle. Denies vaginal odor.   Declines vaginal exam.    Lab Results   Component Value Date/Time    Sodium 143 03/08/2011 02:25 PM    Potassium 4.3 03/08/2011 02:25 PM    Chloride 104 03/08/2011 02:25 PM    CO2 31 03/08/2011 02:25 PM    Anion gap 8 03/08/2011 02:25 PM    Glucose 118 (H) 03/08/2011 02:25 PM    BUN 14 03/08/2011 02:25 PM    Creatinine 0.7 03/08/2011 02:25 PM    BUN/Creatinine ratio 20 03/08/2011 02:25 PM GFR est AA >60 03/08/2011 02:25 PM    GFR est non-AA >60 03/08/2011 02:25 PM    Calcium 9.6 03/08/2011 02:25 PM    Bilirubin, total 0.2 03/08/2011 02:25 PM    Alk. phosphatase 117 03/08/2011 02:25 PM    Protein, total 8.0 03/08/2011 02:25 PM    Albumin 4.4 03/08/2011 02:25 PM    Globulin 3.6 03/08/2011 02:25 PM    A-G Ratio 1.2 03/08/2011 02:25 PM    ALT (SGPT) 27 (L) 03/08/2011 02:25 PM    AST (SGOT) 15 03/08/2011 02:25 PM     Lab Results   Component Value Date/Time    WBC 12.2 03/08/2011 02:25 PM    HGB 13.4 03/08/2011 02:25 PM    HCT 40.6 03/08/2011 02:25 PM    PLATELET 250 22/16/8754 02:25 PM    MCV 87.5 03/08/2011 02:25 PM       Wt Readings from Last 3 Encounters:   04/13/21 118 lb (53.5 kg)   01/04/21 113 lb (51.3 kg)   03/02/15 106 lb 12.8 oz (48.4 kg) (15 %, Z= -1.03)*     * Growth percentiles are based on [facility] (Girls, 2-20 Years) data. Temp Readings from Last 3 Encounters:   04/13/21 98 °F (36.7 °C) (Temporal)   03/02/15 97.5 °F (36.4 °C)     BP Readings from Last 3 Encounters:   04/13/21 112/69   03/02/15 132/85 (98 %, Z = 2.07 /  98 %, Z = 2.07)*     *BP percentiles are based on the 2017 AAP Clinical Practice Guideline for girls     Pulse Readings from Last 3 Encounters:   04/13/21 69   03/02/15 79       Prior to Admission medications    Medication Sig Start Date End Date Taking?  Authorizing Provider   busPIRone (BUSPAR) 5 mg tablet take 1 tablet by mouth three times a day with meals 2/2/21  Yes Pan Donohue, MIGUEL   methocarbamoL (ROBAXIN) 500 mg tablet take 2 tablets by mouth four times a day if needed for muscle spasm 2/2/21   Brinda NGUYEN, NP   amoxicillin-clavulanate (AUGMENTIN) 500-125 mg per tablet  12/14/20   Provider, Historical   erythromycin (ILOTYCIN) ophthalmic ointment  12/14/20   Provider, Historical   predniSONE (STERAPRED DS) 10 mg dose pack See administration instruction per 10mg dose pack 9/28/20   Pan Donohue NP         The following sections were reviewed & updated as appropriate: PMH, PSH, FH, and SH. Review of Systems   Constitutional: Negative for activity change, appetite change, chills, fatigue and fever. Respiratory: Negative for chest tightness and shortness of breath. Cardiovascular: Negative for chest pain. Genitourinary: Positive for vaginal discharge (cottage cheese in appearance). Negative for frequency, pelvic pain, urgency and vaginal pain. Neurological: Negative for dizziness and headaches. Objective:     Visit Vitals  /69 (BP 1 Location: Left arm, BP Patient Position: Sitting, BP Cuff Size: Adult)   Pulse 69   Temp 98 °F (36.7 °C) (Temporal)   Resp 18   Ht 4' 11\" (1.499 m)   Wt 118 lb (53.5 kg)   LMP 03/20/2021   SpO2 98%   BMI 23.83 kg/m²      Physical Exam  Constitutional:       General: She is not in acute distress. Appearance: She is well-developed. HENT:      Head: Normocephalic and atraumatic. Neck:      Musculoskeletal: Normal range of motion and neck supple. Vascular: No carotid bruit. Cardiovascular:      Rate and Rhythm: Normal rate and regular rhythm. Heart sounds: Normal heart sounds. No murmur. No friction rub. No gallop. Pulmonary:      Effort: Pulmonary effort is normal.      Breath sounds: Normal breath sounds. No decreased breath sounds, wheezing, rhonchi or rales. Abdominal:      General: Bowel sounds are normal.      Palpations: Abdomen is soft. Tenderness: There is no abdominal tenderness. Genitourinary:     Comments: Pelvic exam: exam declined by the patient. Lymphadenopathy:      Cervical: No cervical adenopathy. Skin:     General: Skin is warm and dry. Neurological:      Mental Status: She is alert and oriented to person, place, and time. Disclaimer: The patient understands our medical plan. Alternatives have been explained and offered. The risks, benefits and significant side effects of all medications have been reviewed.  Anticipated time course and progression of condition reviewed. All questions have been addressed. She is encouraged to employ the information provided in the after visit summary, which was reviewed. Where applicable, she is instructed to call the clinic if she has not been notified either by phone or through [address] with the results of her tests or with an appointment plan for any referrals within 1 week(s). No news is not good news; it's no news. The patient  is to call if her condition worsens or fails to improve or if significant side effects are experienced. Aspects of this note may have been generated using voice recognition software. Despite editing, there may be unrecognized errors.        Nola Garza, MIGUEL

## 2021-07-08 RX ORDER — BUSPIRONE HYDROCHLORIDE 5 MG/1
TABLET ORAL
Qty: 90 TABLET | Refills: 2 | Status: SHIPPED | OUTPATIENT
Start: 2021-07-08 | End: 2021-11-02 | Stop reason: ALTCHOICE

## 2021-11-02 ENCOUNTER — OFFICE VISIT (OUTPATIENT)
Dept: FAMILY MEDICINE CLINIC | Age: 26
End: 2021-11-02
Payer: MEDICAID

## 2021-11-02 VITALS
OXYGEN SATURATION: 98 % | TEMPERATURE: 97.8 F | SYSTOLIC BLOOD PRESSURE: 118 MMHG | DIASTOLIC BLOOD PRESSURE: 73 MMHG | RESPIRATION RATE: 20 BRPM | BODY MASS INDEX: 25.6 KG/M2 | WEIGHT: 127 LBS | HEIGHT: 59 IN | HEART RATE: 82 BPM

## 2021-11-02 DIAGNOSIS — M26.622 TMJ TENDERNESS, LEFT: Primary | ICD-10-CM

## 2021-11-02 DIAGNOSIS — M26.609 TMJ DYSFUNCTION: ICD-10-CM

## 2021-11-02 PROCEDURE — 99214 OFFICE O/P EST MOD 30 MIN: CPT | Performed by: NURSE PRACTITIONER

## 2021-11-02 RX ORDER — DICLOFENAC SODIUM AND MISOPROSTOL 50; 200 MG/1; UG/1
1 TABLET, DELAYED RELEASE ORAL
Qty: 60 TABLET | Refills: 2 | Status: SHIPPED | OUTPATIENT
Start: 2021-11-02 | End: 2021-12-28

## 2021-11-02 RX ORDER — HYDROCODONE BITARTRATE AND ACETAMINOPHEN 7.5; 325 MG/1; MG/1
1 TABLET ORAL
Qty: 28 TABLET | Refills: 0 | Status: SHIPPED | OUTPATIENT
Start: 2021-11-02 | End: 2021-12-28 | Stop reason: SDUPTHER

## 2021-11-02 RX ORDER — METAXALONE 800 MG/1
800 TABLET ORAL
Qty: 90 TABLET | Refills: 2 | Status: SHIPPED | OUTPATIENT
Start: 2021-11-02 | End: 2021-11-11

## 2021-11-02 RX ORDER — DICLOFENAC SODIUM 10 MG/G
2 GEL TOPICAL
Qty: 100 G | Refills: 2 | Status: SHIPPED | OUTPATIENT
Start: 2021-11-02

## 2021-11-02 NOTE — PROGRESS NOTES
1. \"Have you been to the ER, urgent care clinic since your last visit? Hospitalized since your last visit? \" No    2. \"Have you seen or consulted any other health care providers outside of the [address] [place] [address] since your last visit? \" No     3. For patients aged 39-70: Has the patient had a colonoscopy? No     If the patient is female:    4. For patients aged 41-77: Has the patient had a mammogram within the past 2 years? No    5. For patients aged 21-65: Has the patient had a pap smear? No    No flowsheet data found. 3 most recent PHQ Screens 11/2/2021   Little interest or pleasure in doing things Not at all   Feeling down, depressed, irritable, or hopeless Not at all   Total Score PHQ 2 0   Trouble falling or staying asleep, or sleeping too much -   Feeling tired or having little energy -   Poor appetite, weight loss, or overeating -   Feeling bad about yourself - or that you are a failure or have let yourself or your family down -   Trouble concentrating on things such as school, work, reading, or watching TV -   Moving or speaking so slowly that other people could have noticed; or the opposite being so fidgety that others notice -   Thoughts of being better off dead, or hurting yourself in some way -   PHQ 9 Score -   How difficult have these problems made it for you to do your work, take care of your home and get along with others -     No flowsheet data found. No flowsheet data found.       Okay to discontinue medications per verbal order Jimmy LUNA

## 2021-11-02 NOTE — PROGRESS NOTES
Jared Torres is a 27 y.o. female who was seen in clinic today (11/2/2021) for TMJ  mother present during office visit  Assessment & Plan:   Diagnoses and all orders for this visit:    1. TMJ tenderness, left  -     metaxalone (SKELAXIN) 800 mg tablet; Take 1 Tablet by mouth three (3) times daily as needed for Muscle Spasm(s). -     diclofenac (VOLTAREN) 1 % gel; Apply 2 g to affected area four (4) times daily as needed for Pain.  -     diclofenac-miSOPROStol (ARTHROTEC 50)  mg-mcg per tablet; Take 1 Tablet by mouth two (2) times daily as needed for Pain.  -     HYDROcodone-acetaminophen (NORCO) 7.5-325 mg per tablet; Take 1 Tablet by mouth every six (6) hours as needed for Pain for up to 7 days. Max Daily Amount: 4 Tablets. 2. TMJ dysfunction  -     metaxalone (SKELAXIN) 800 mg tablet; Take 1 Tablet by mouth three (3) times daily as needed for Muscle Spasm(s). -     diclofenac (VOLTAREN) 1 % gel; Apply 2 g to affected area four (4) times daily as needed for Pain.  -     diclofenac-miSOPROStol (ARTHROTEC 50)  mg-mcg per tablet; Take 1 Tablet by mouth two (2) times daily as needed for Pain.  -     HYDROcodone-acetaminophen (NORCO) 7.5-325 mg per tablet; Take 1 Tablet by mouth every six (6) hours as needed for Pain for up to 7 days. Max Daily Amount: 4 Tablets. I have discussed the diagnosis with the patient and mother and the intended plan as seen in the above orders. The patient and mother has received an after-visit summary and questions were answered concerning future plans. I have discussed medication side effects and warnings with the patient and mother as well. patient and mother verbalizes understanding  patient and mother agreeable with above plan and verbalizes understanding. Follow-up and Dispositions    · Return in about 4 weeks (around 11/30/2021) for TMJ, telemedicine MyChart. Subjective:   Patient states she has been worsening of TMJ in left jaw.   States she does have a custom mouth guard in place. Patient taking ibuprofen otc 2 tabs every 4 hrs, daily to help with the pain. Mother states within a 2 week period patient may have 3 days she has severe pain. Mother will send patient to maternal grandmother for pain medication(Tramadol) with ibuprofen. Patient states for severe pain she will take ibuprofen along with tylenol. She has also been taking robaxin. Patient states her dentist is sending her physical therapy. Patient is currently going to the specialist once a month in follow up. Lab Results   Component Value Date/Time    Sodium 143 03/08/2011 02:25 PM    Potassium 4.3 03/08/2011 02:25 PM    Chloride 104 03/08/2011 02:25 PM    CO2 31 03/08/2011 02:25 PM    Anion gap 8 03/08/2011 02:25 PM    Glucose 118 (H) 03/08/2011 02:25 PM    BUN 14 03/08/2011 02:25 PM    Creatinine 0.7 03/08/2011 02:25 PM    BUN/Creatinine ratio 20 03/08/2011 02:25 PM    GFR est AA >60 03/08/2011 02:25 PM    GFR est non-AA >60 03/08/2011 02:25 PM    Calcium 9.6 03/08/2011 02:25 PM    Bilirubin, total 0.2 03/08/2011 02:25 PM    Alk.  phosphatase 117 03/08/2011 02:25 PM    Protein, total 8.0 03/08/2011 02:25 PM    Albumin 4.4 03/08/2011 02:25 PM    Globulin 3.6 03/08/2011 02:25 PM    A-G Ratio 1.2 03/08/2011 02:25 PM    ALT (SGPT) 27 (L) 03/08/2011 02:25 PM    AST (SGOT) 15 03/08/2011 02:25 PM     No results found for: CHOL, CHOLPOCT, CHOLX, CHLST, CHOLV, HDL, HDLPOC, HDLP, LDL, LDLCPOC, LDLC, DLDLP, VLDLC, VLDL, TGLX, TRIGL, TRIGP, TGLPOCT, CHHD, CHHDX  No results found for: HBA1C, JPN0MYAX, [id number]  No results found for: [place], [id number]    Lab Results   Component Value Date/Time    WBC 12.2 03/08/2011 02:25 PM    HGB 13.4 03/08/2011 02:25 PM    HCT 40.6 03/08/2011 02:25 PM    PLATELET 458 06/08/6124 02:25 PM    MCV 87.5 03/08/2011 02:25 PM       Wt Readings from Last 3 Encounters:   11/02/21 127 lb (57.6 kg)   04/13/21 118 lb (53.5 kg)   01/04/21 113 lb (51.3 kg)     Temp Readings from Last 3 Encounters:   11/02/21 97.8 °F (36.6 °C) (Temporal)   04/13/21 98 °F (36.7 °C) (Temporal)   03/02/15 97.5 °F (36.4 °C)     BP Readings from Last 3 Encounters:   11/02/21 118/73   04/13/21 112/69   03/02/15 132/85 (98 %, Z = 2.07 /  98 %, Z = 2.07)*     *BP percentiles are based on the 2017 AAP Clinical Practice Guideline for girls     Pulse Readings from Last 3 Encounters:   11/02/21 82   04/13/21 69   03/02/15 79       Prior to Admission medications    Medication Sig Start Date End Date Taking? Authorizing Provider   busPIRone (BUSPAR) 5 mg tablet take 1 tablet by mouth three times a day with meals 7/8/21  Yes Ulisses Urias NP   methocarbamoL (ROBAXIN) 500 mg tablet take 2 tablets by mouth four times a day if needed for muscle spasm  Patient not taking: Reported on 11/2/2021 4/13/21   Ulisses Urias NP     The following sections were reviewed & updated as appropriate: PMH, PSH, FH, and SH. Review of Systems   Constitutional: Negative for activity change, appetite change, chills, fatigue and fever. Respiratory: Negative for chest tightness and shortness of breath. Cardiovascular: Negative for chest pain. Neurological: Negative for dizziness and headaches. Objective:     Visit Vitals  /73 (BP 1 Location: Right arm, BP Patient Position: Sitting, BP Cuff Size: Adult)   Pulse 82   Temp 97.8 °F (36.6 °C) (Temporal)   Resp 20   Ht 4' 11\" (1.499 m)   Wt 127 lb (57.6 kg)   LMP 10/24/2021   SpO2 98%   BMI 25.65 kg/m²      Physical Exam  Constitutional:       General: She is not in acute distress. Appearance: She is well-developed. HENT:      Head: Normocephalic and atraumatic. Jaw: Tenderness (left) present. Neck:      Vascular: No carotid bruit. Cardiovascular:      Rate and Rhythm: Normal rate and regular rhythm. Heart sounds: Normal heart sounds. No murmur heard. No friction rub. No gallop.     Pulmonary:      Effort: Pulmonary effort is normal.      Breath sounds: Normal breath sounds. No decreased breath sounds, wheezing, rhonchi or rales. Musculoskeletal:      Cervical back: Normal range of motion and neck supple. Lymphadenopathy:      Cervical: No cervical adenopathy. Skin:     General: Skin is warm and dry. Neurological:      Mental Status: She is alert and oriented to person, place, and time. Disclaimer: The patient understands our medical plan. Alternatives have been explained and offered. The risks, benefits and significant side effects of all medications have been reviewed. Anticipated time course and progression of condition reviewed. All questions have been addressed. She is encouraged to employ the information provided in the after visit summary, which was reviewed. Where applicable, she is instructed to call the clinic if she has not been notified either by phone or through [address] with the results of her tests or with an appointment plan for any referrals within 1 week(s). No news is not good news; it's no news. The patient  is to call if her condition worsens or fails to improve or if significant side effects are experienced. Aspects of this note may have been generated using voice recognition software. Despite editing, there may be unrecognized errors.        Dahiana Kaminski NP

## 2021-11-05 ENCOUNTER — TELEPHONE (OUTPATIENT)
Dept: FAMILY MEDICINE CLINIC | Age: 26
End: 2021-11-05

## 2021-11-05 ENCOUNTER — PATIENT MESSAGE (OUTPATIENT)
Dept: FAMILY MEDICINE CLINIC | Age: 26
End: 2021-11-05

## 2021-11-05 NOTE — TELEPHONE ENCOUNTER
Aleisha Hawk (Key: [id number]) - [id number]  Dee Ashley 50-0.2MG dr tablets       Status: Sent to Plan    Created: November 2nd, 2021 [phone]    Sent: November 5th, 2021    Open  [facility] as not sent  Archive    Aleisha Hawk (Key: [id number]) - [id number]  Metaxalone 800MG tablets       Status: Sent to Plan    Created: November 2nd, 2021 [phone]    Sent: November 5th, 2021    Open  [facility] as not sent  Archive

## 2021-11-11 RX ORDER — TIZANIDINE 4 MG/1
4 TABLET ORAL
Qty: 60 TABLET | Refills: 2 | Status: SHIPPED | OUTPATIENT
Start: 2021-11-11 | End: 2022-06-07 | Stop reason: SDUPTHER

## 2021-11-11 RX ORDER — MISOPROSTOL 200 UG/1
200 TABLET ORAL 2 TIMES DAILY
Qty: 60 TABLET | Refills: 1 | Status: SHIPPED | OUTPATIENT
Start: 2021-11-11 | End: 2021-12-28 | Stop reason: SINTOL

## 2021-11-11 RX ORDER — DICLOFENAC SODIUM 50 MG/1
50 TABLET, DELAYED RELEASE ORAL 2 TIMES DAILY
Qty: 60 TABLET | Refills: 1 | Status: SHIPPED | OUTPATIENT
Start: 2021-11-11 | End: 2022-03-17

## 2021-11-17 ENCOUNTER — HOSPITAL ENCOUNTER (OUTPATIENT)
Dept: PHYSICAL THERAPY | Age: 26
Discharge: HOME | End: 2021-11-17
Payer: MEDICAID

## 2021-11-17 PROCEDURE — 97162 PT EVAL MOD COMPLEX 30 MIN: CPT

## 2021-11-17 NOTE — PROGRESS NOTES
[facility]  [address]  [place], [address]  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 Assessment/ key information: 27y.o. year old female presents with CC of left sided C/S and left TMJ pain that is chronic in nature. Impairments noted today: decreased deep anterior C/S strength; decreased right TMJ lateral mobility and strength; decreased T/S mobility and scapular strength/stability. Patient will benefit from physical therapy to address deficits, and ultimately to return patient to prior level of function. Evaluation Complexity History LOW Complexity : Zero comorbidities / personal factors that will impact the outcome / POC; Examination MEDIUM Complexity : 3 Standardized tests and measures addressing body structure, function, activity limitation and / or participation in recreation  ;Presentation MEDIUM Complexity : Evolving with changing characteristics  ; Clinical Decision Making MEDIUM Complexity : FOTO score of 26-74  Overall Complexity Rating: MEDIUM  Problem List: pain affecting function, decrease ROM, decrease strength, decrease ADL/ functional abilitiies, decrease activity tolerance and decrease flexibility/ joint mobility   Treatment Plan may include any combination of the following: Therapeutic exercise, Neuromuscular re-education, Physical agent/modality, Manual therapy and Patient education  Patient / Family readiness to learn indicated by: asking questions, trying to perform skills and interest  Persons(s) to be included in education: patient (P)  Barriers to Learning/Limitations: None  Patient Goal (s): I want my quality of life back  Patient Self Reported Health Status: good  Rehabilitation Potential: good    Short Term Goals: To be accomplished in 2 weeks:  1. I and compliant with HEP for self management of symptoms. Long Term Goals: To be accomplished in 4 weeks:  1. Improve T/S ext by 50% to increase mobility for daily activities. 2. Increase right mandibular lateral deviation to = left to increase ease with chewing food,  3. Increase B scapular strength to grossly 4-/5 to improve stability for ADLs and improve postural awareness. Frequency / Duration: Patient to be seen 2 times per week for 4 weeks. Patient/ Caregiver education and instruction: Diagnosis, prognosis, self care and exercises   [x]  Plan of care has been reviewed with NNEKA Ly, PT 11/17/2021 1:36 PM    ________________________________________________________________________    I certify that the above Therapy Services are being furnished while the patient is under my care. I agree with the treatment plan and certify that this therapy is necessary.     [de-identified] Signature:____________Date:_________TIME:________     Yaa Marinelli, DMD  ** Signature, Date and Time must be completed for valid certification **    Please sign and return to [facility] [address]  [address]  [place], [address]  [phone] fax

## 2021-11-17 NOTE — PROGRESS NOTES
PT DAILY TREATMENT NOTE 10-18    Patient Name: Margy Clements  Date:2021  : 1995  [x]  Patient  Verified  Payor: Heidi Pen / [place] / Product Type: Managed Care Medicaid /    In time:1246  Out time:120  Total Treatment Time (min): 34  Visit #: 1 of 8    Treatment Area: Neck pain [M54.2]  TMJ (dislocation of temporomandibular joint) [S03.00XA]    SUBJECTIVE  Pain Level (0-10 scale): 0  Any medication changes, allergies to medications, adverse drug reactions, diagnosis change, or new procedure performed?: [x] No    [] Yes (see summary sheet for update)  Subjective functional status/changes:   [] No changes reported  See eval    OBJECTIVE      34 min [x]Eval                  []Re-Eval       With   [] TE   [] TA   [] neuro   [] other: Patient Education: [x] Review HEP    [] Progressed/Changed HEP based on:   [] positioning   [] body mechanics   [] transfers   [] heat/ice application    [] other:      Other Objective/Functional Measures:      Pain Level (0-10 scale) post treatment: 0    ASSESSMENT/Changes in Function: see POC    Patient will continue to benefit from skilled PT services to modify and progress therapeutic interventions, address functional mobility deficits, address ROM deficits, address strength deficits, analyze and address soft tissue restrictions, analyze and cue movement patterns and assess and modify postural abnormalities to attain remaining goals. [x]  See Plan of Care  []  See progress note/recertification  []  See Discharge Summary         Progress towards goals / Updated goals:  Short Term Goals: To be accomplished in 2 weeks:  1. I and compliant with HEP for self management of symptoms. IE: issued HEP  Long Term Goals: To be accomplished in 4 weeks:  1. Improve T/S ext by 50% to increase mobility for daily activities. IE: 25% extension  2. Increase right mandibular lateral deviation to = left to increase ease with chewing food.   IE: 50% decrease compared to left  3. Increase B scapular strength to grossly 4-/5 to improve stability for ADLs and improve postural awareness.    IE: 2+3/5 grossly   PLAN  []  Upgrade activities as tolerated     [x]  Continue plan of care  []  Update interventions per flow sheet       []  Discharge due to:_  []  Other:_      Jamia Brooke, KAVITA, CMTPT 11/17/2021  1:53 PM    Future Appointments   Date Time Provider Rory Elliott   11/30/2021 12:00 PM Jeris Rumegans, PT [facility]   12/2/2021 12:45 PM Jeris Rumegans, PT [facility]   12/7/2021  3:00 PM Jeris Rusandy, PT [facility]   12/10/2021 12:00 PM Jeris Rumegans, PT [facility]

## 2021-11-30 ENCOUNTER — HOSPITAL ENCOUNTER (OUTPATIENT)
Dept: PHYSICAL THERAPY | Age: 26
Discharge: HOME | End: 2021-11-30
Payer: MEDICAID

## 2021-11-30 PROCEDURE — 97112 NEUROMUSCULAR REEDUCATION: CPT

## 2021-11-30 PROCEDURE — 97110 THERAPEUTIC EXERCISES: CPT

## 2021-11-30 PROCEDURE — 97530 THERAPEUTIC ACTIVITIES: CPT

## 2021-11-30 NOTE — PROGRESS NOTES
PT DAILY TREATMENT NOTE 10-18    Patient Name: Jose Raul Seth  Date:2021  : 1995  [x]  Patient  Verified  Payor: Trey Vernon / [place] / Product Type: Managed Care Medicaid /    In time:1200  Out time:1236  Total Treatment Time (min): 36  Visit #: 2 of 8      Treatment Area: Neck pain [M54.2]  TMJ (dislocation of temporomandibular joint) [S03.00XA]    SUBJECTIVE  Pain Level (0-10 scale): 6  Any medication changes, allergies to medications, adverse drug reactions, diagnosis change, or new procedure performed?: [x] No    [] Yes (see summary sheet for update)  Subjective functional status/changes:   [] No changes reported  I've been doing my exercises. OBJECTIVE      11 min Therapeutic Exercise:  [] See flow sheet :   Rationale: increase ROM and increase strength to improve the patients ability to perform daily activities      12 min Neuromuscular Re-education:  []  See flow sheet :   Rationale: increase strength, improve coordination, improve balance and increase proprioception  to improve the patients ability to recruit ant and post chain appropriately for daily activities   8 min Therapeutic Activity:  []  See flow sheet :   Rationale: increase strength  to improve the patients ability to perform ADLs with improved posture    5 min Manual Therapy:  SOR; STM left SCM, scalenes, C/S paraspinals, masseter; grade 4 upper T/S mobs   The manual therapy interventions were performed at a separate and distinct time from the therapeutic activities interventions.   Rationale: decrease pain, increase ROM and increase tissue extensibility to perform ADLs  With   [] TE   [] TA   [] neuro   [] other: Patient Education: [x] Review HEP    [] Progressed/Changed HEP based on:   [] positioning   [] body mechanics   [] transfers   [] heat/ice application    [] other:      Other Objective/Functional Measures:      Pain Level (0-10 scale) post treatment: 3    ASSESSMENT/Changes in Function: T/S and lower C/s hypomobile with increased L/S lordosis. Required cueing to correct form in QP position to avoid upper C/S extension and to engage TA. Patient will continue to benefit from skilled PT services to modify and progress therapeutic interventions, address functional mobility deficits, address ROM deficits, address strength deficits, analyze and address soft tissue restrictions, analyze and cue movement patterns and assess and modify postural abnormalities to attain remaining goals. []  See Plan of Care  []  See progress note/recertification  []  See Discharge Summary         Progress towards goals / Updated goals:  Short Term Goals: To be accomplished in 2 weeks:  1. I and compliant with HEP for self management of symptoms.   IE: issued HEP  Current: goal met 11/30/21  Long Term Goals: To be accomplished in 4 weeks:  1. Improve T/S ext by 50% to increase mobility for daily activities. IE: 25% extension  2. Increase right mandibular lateral deviation to = left to increase ease with chewing food. IE: 50% decrease compared to left  3.  Increase B scapular strength to grossly 4-/5 to improve stability for ADLs and improve postural awareness.   IE: 2+3/5 grossly     PLAN  []  Upgrade activities as tolerated     [x]  Continue plan of care  []  Update interventions per flow sheet       []  Discharge due to:_  []  Other:_      Trent Roman, KAVITA, CMTPT 11/30/2021  8:48 AM    Future Appointments   Date Time Provider Rory Elliott   11/30/2021 12:00 PM Kunal Parker PT [facility]   12/2/2021 12:45 PM Kunal Parker PT [facility]   12/7/2021  3:00 PM Kunal Parker PT [facility]   12/10/2021 12:00 PM Kunal Parker PT [facility]

## 2021-12-02 ENCOUNTER — HOSPITAL ENCOUNTER (OUTPATIENT)
Dept: PHYSICAL THERAPY | Age: 26
Discharge: HOME | End: 2021-12-02
Payer: MEDICAID

## 2021-12-02 PROCEDURE — 97112 NEUROMUSCULAR REEDUCATION: CPT

## 2021-12-02 PROCEDURE — 97110 THERAPEUTIC EXERCISES: CPT

## 2021-12-02 PROCEDURE — 97530 THERAPEUTIC ACTIVITIES: CPT

## 2021-12-02 NOTE — PROGRESS NOTES
PT DAILY TREATMENT NOTE 10-18    Patient Name: Gerry Murray  Date:2021  : 1995  [x]  Patient  Verified  Payor: Danie Bess / [place] / Product Type: Managed Care Medicaid /    In time:1245  Out time:123  Total Treatment Time (min): 38  Visit #: 3 of 8  Treatment Area: Neck pain [M54.2]  TMJ (dislocation of temporomandibular joint) [S03.00XA]    SUBJECTIVE  Pain Level (0-10 scale): 2 with 4 ibuprofen   Any medication changes, allergies to medications, adverse drug reactions, diagnosis change, or new procedure performed?: [x] No    [] Yes (see summary sheet for update)  Subjective functional status/changes:   [] No changes reported  It feels better, but I took 4 ibuprofen before I came. OBJECTIVE    10 min Therapeutic Exercise:  [] See flow sheet :   Rationale: increase ROM and increase strength to improve the patients ability to perform daily activities      15 min Neuromuscular Re-education:  []  See flow sheet :PRANEETH MARIO GHJ post correction 75%   Rationale: increase strength, improve coordination, improve balance and increase proprioception  to improve the patients ability to utilize ant and post chain appropriately for daily activities   8 min Therapeutic Activity:  []  See flow sheet :   Rationale: increase strength, improve coordination, improve balance and increase proprioception  to improve the patients ability to perform ADLs    5 min Manual Therapy:   SOR; STM left SCM, scalenes, C/S paraspinals, masseter; grade 4 upper T/S mobs   The manual therapy interventions were performed at a separate and distinct time from the therapeutic activities interventions.   Rationale: decrease pain, increase ROM and increase tissue extensibility to perform ADLs with less pain  With   [] TE   [] TA   [] neuro   [] other: Patient Education: [x] Review HEP    [] Progressed/Changed HEP based on:   [] positioning   [] body mechanics   [] transfers   [] heat/ice application    [] other: Other Objective/Functional Measures:      Pain Level (0-10 scale) post treatment: 0    ASSESSMENT/Changes in Function: Added KT to assist with postural awareness and decrease tendency to sit with rounded shoulder while working at desk. Fatigues easily with scapular strengthening exercises. Patient will continue to benefit from skilled PT services to modify and progress therapeutic interventions, address functional mobility deficits, address strength deficits, analyze and address soft tissue restrictions, analyze and cue movement patterns and assess and modify postural abnormalities to attain remaining goals. []  See Plan of Care  []  See progress note/recertification  []  See Discharge Summary         Progress towards goals / Updated goals:  Short Term Goals: To be accomplished in 2 weeks:  1. I and compliant with HEP for self management of symptoms.   IE: issued HEP  Current: goal met 11/30/21  Long Term Goals: To be accomplished in 4 weeks:  1. Improve T/S ext by 50% to increase mobility for daily activities. IE: 25% extension  Current: no change- increased T/S kyphosis noted in sitting 12/2/21  2. Increase right mandibular lateral deviation to = left to increase ease with chewing food. IE: 50% decrease compared to left  3.  Increase B scapular strength to grossly 4-/5 to improve stability for ADLs and improve postural awareness.   IE: 2+3/5 grossly     PLAN  [x]  Upgrade activities as tolerated     []  Continue plan of care  []  Update interventions per flow sheet       []  Discharge due to:_  []  Other:_      Kym Mustafa, MPT, CMTPT 12/2/2021  9:16 AM    Future Appointments   Date Time Provider Rory Elliott   12/2/2021 12:45 PM Beni Hui, PT [facility]   12/7/2021  3:00 PM Beni Hui, PT [facility]   12/10/2021 12:00 PM Beni Hui, PT [facility]

## 2021-12-07 ENCOUNTER — HOSPITAL ENCOUNTER (OUTPATIENT)
Dept: PHYSICAL THERAPY | Age: 26
Discharge: HOME | End: 2021-12-07
Payer: MEDICAID

## 2021-12-07 PROCEDURE — 97110 THERAPEUTIC EXERCISES: CPT

## 2021-12-07 PROCEDURE — 97530 THERAPEUTIC ACTIVITIES: CPT

## 2021-12-07 PROCEDURE — 97112 NEUROMUSCULAR REEDUCATION: CPT

## 2021-12-07 NOTE — PROGRESS NOTES
PT DAILY TREATMENT NOTE 10-18    Patient Name: Jose Raul Seth  Date:2021  : 1995  [x]  Patient  Verified  Payor: Trey Vernon / [place] / Product Type: Managed Care Medicaid /    In time:259  Out time:340  Total Treatment Time (min): 41  Visit #: 4 of 8    Treatment Area: Neck pain [M54.2]  TMJ (dislocation of temporomandibular joint) [S03.00XA]    SUBJECTIVE  Pain Level (0-10 scale): 5  Any medication changes, allergies to medications, adverse drug reactions, diagnosis change, or new procedure performed?: [x] No    [] Yes (see summary sheet for update)  Subjective functional status/changes:   [] No changes reported  I'm getting really frustrated. I don't get any long lasting relief from anything. OBJECTIVE        14 min Therapeutic Exercise:  [] See flow sheet :   Rationale: increase ROM and increase strength to improve the patients ability to perform daily activities      12 min Neuromuscular Re-education:  []  See flow sheet :   Rationale: increase strength and increase proprioception  to improve the patients ability to recruit TA and glutes for daily activities   10 min Therapeutic Activity:  []  See flow sheet :   Rationale: increase strength  to improve the patients ability to perform ADLs  . 5 min Manual Therapy:   SOR; STM left SCM, scalenes, C/S paraspinals, masseter; grade 4 upper T/S mobs   The manual therapy interventions were performed at a separate and distinct time from the therapeutic activities interventions.   Rationale: decrease pain, increase ROM and increase tissue extensibility to perform ADLs  With   [] TE   [] TA   [] neuro   [] other: Patient Education: [x] Review HEP    [] Progressed/Changed HEP based on:   [] positioning   [] body mechanics   [] transfers   [] heat/ice application    [] other:      Other Objective/Functional Measures:      Pain Level (0-10 scale) post treatment: 0    ASSESSMENT/Changes in Function: Patient presents today feeling very frustrated. Reports therapy helps, but there is minimal carry over between visits. Encouraged patient to get new headset hooked up to computer to help with work ergonomics. Also encouraged patient to increase water intake and keep food journal to track if diet is linked with flare-ups of pain. Patient will continue to benefit from skilled PT services to modify and progress therapeutic interventions, address functional mobility deficits, address strength deficits, analyze and address soft tissue restrictions, analyze and cue movement patterns and assess and modify postural abnormalities to attain remaining goals. []  See Plan of Care  []  See progress note/recertification  []  See Discharge Summary         Progress towards goals / Updated goals:  Short Term Goals: To be accomplished in 2 weeks:  1. I and compliant with HEP for self management of symptoms.   IE: issued HEP  Current: goal met 11/30/21  Long Term Goals: To be accomplished in 4 weeks:  1. Improve T/S ext by 50% to increase mobility for daily activities. IE: 25% extension  Current: no change- increased T/S kyphosis noted in sitting 12/2/21  2. Increase right mandibular lateral deviation to = left to increase ease with chewing food. IE: 50% decrease compared to left  Current: goal met- equal lateral deviation 12/7/21  3.  Increase B scapular strength to grossly 4-/5 to improve stability for ADLs and improve postural awareness.   IE: 2+3/5 grossly        PLAN  [x]  Upgrade activities as tolerated     []  Continue plan of care  []  Update interventions per flow sheet       []  Discharge due to:_  []  Other:_      Braulio Cook, KAVITA, CMTPT 12/7/2021  8:55 AM    Future Appointments   Date Time Provider Rory Elliott   12/7/2021  3:00 [facility]   12/10/2021 12:00 PM [facility]   12/28/2021  4:40 PM Mariano De Dios NP [facility] BS AMB

## 2021-12-10 ENCOUNTER — HOSPITAL ENCOUNTER (OUTPATIENT)
Dept: PHYSICAL THERAPY | Age: 26
Discharge: HOME | End: 2021-12-10
Payer: MEDICAID

## 2021-12-10 PROCEDURE — 97112 NEUROMUSCULAR REEDUCATION: CPT

## 2021-12-10 PROCEDURE — 97110 THERAPEUTIC EXERCISES: CPT

## 2021-12-10 PROCEDURE — 97530 THERAPEUTIC ACTIVITIES: CPT

## 2021-12-10 NOTE — PROGRESS NOTES
[facility]  [address]  [place], [address]  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 I and compliant with HEP for self management of symptoms.   IE: issued HEP  Current: goal met   [address] be accomplished in 4 weeks:  1. Improve T/S ext by 50% to increase mobility for daily activities. IE: 25% extension  Current:25% increase   2. Increase right mandibular lateral deviation to = left to increase ease with chewing food. IE: 50% decrease compared to left  Current: goal met- equal lateral deviation   3. Increase B scapular strength to grossly 4-/5 to improve stability for ADLs and improve postural awareness.   IE: 2+3/5 grossly   Current: 3+/5   Key Functional Changes: scapular strength has improved by 1/2 grade    Updated Goals: to be achieved in 2 weeks:  1. Improve T/S ext by 50% to increase mobility for daily activities. PN:25% increase  2. Increase B scapular strength to grossly 4-/5 to improve stability for ADLs and improve postural awareness.   PN: 3+/5 B  Patient is slowly progressing with strength and stability. Job requirements and life stressors continue to affect patient's posture.   Patient will benefit from continuing with PT to further progress with postural education and to improve strength and stability. ASSESSMENT/RECOMMENDATIONS:  [x]Continue therapy per initial plan/protocol at a frequency of  2 x per week for 2 weeks  []Continue therapy with the following recommended changes:_____________________      _____________________________________________________________________  []Discontinue therapy progressing towards or have reached established goals  []Discontinue therapy due to lack of appreciable progress towards goals  []Discontinue therapy due to lack of attendance or compliance  []Await Physician's recommendations/decisions regarding therapy  []Other:________________________________________________________________    Thank you for this referral.    Melita Alarcon, PT 12/10/2021 12:43 PM  NOTE TO PHYSICIAN:  PLEASE COMPLETE THE ORDERS BELOW AND   FAX TO [facility]: [phone]  If you are unable to process this request in 24 hours please contact our office: [phone]    ? I have read the above report and request that my patient continue as recommended. ? I have read the above report and request that my patient continue therapy with the following changes/special instructions:__________________________________________________________  ? I have read the above report and request that my patient be discharged from therapy.     Physicians signature: ______________________________Date: ______Time:______     Elena Pete DMD

## 2021-12-10 NOTE — PROGRESS NOTES
PT DAILY TREATMENT NOTE 10-18    Patient Name: Jose Raul Seth  Date:12/10/2021  : 1995  [x]  Patient  Verified  Payor: Trey Vernon / [place] / Product Type: Managed Care Medicaid /    In time:1156  Out time:1235  Total Treatment Time (min): 39  Visit #: 5 of 8      Treatment Area: Neck pain [M54.2]  TMJ (dislocation of temporomandibular joint) [S03.00XA]    SUBJECTIVE  Pain Level (0-10 scale): 2  Any medication changes, allergies to medications, adverse drug reactions, diagnosis change, or new procedure performed?: [x] No    [] Yes (see summary sheet for update)  Subjective functional status/changes:   [] No changes reported  It's doing ok today and I haven't taken any medicine. OBJECTIVE    11 min Therapeutic Activity:  []  See flow sheet :   Rationale: increase ROM and increase strength  to improve the patients ability to perform daily activities     8 min Therapeutic Exercise:  [] See flow sheet :   Rationale: increase ROM and increase strength to improve the patients ability to perform ADLs     15 min Neuromuscular Re-education:  []  See flow sheet :   Rationale: increase ROM and increase strength  to improve the patients ability to recruit ant and post chain appropriately for daily activities     5 min Manual Therapy:   SOR; STM left SCM, scalenes, C/S paraspinals, masseter; grade 4 upper T/S mobs   The manual therapy interventions were performed at a separate and distinct time from the therapeutic activities interventions.   Rationale: decrease pain, increase ROM and increase tissue extensibility to improve postural awareness and decrease pain with eating  With   [] TE   [] TA   [] neuro   [] other: Patient Education: [x] Review HEP    [] Progressed/Changed HEP based on:   [] positioning   [] body mechanics   [] transfers   [] heat/ice application    [] other:      Other Objective/Functional Measures:      Pain Level (0-10 scale) post treatment: 0    ASSESSMENT/Changes in Function: Patient is slowly progressing with strength and stability. Job requirements and life stressors continue to affect patient's posture. Patient will benefit from continuing with PT to further progress with postural education and to improve strength and stability. Patient will continue to benefit from skilled PT services to modify and progress therapeutic interventions, address functional mobility deficits, address ROM deficits, address strength deficits, analyze and address soft tissue restrictions, analyze and cue movement patterns and assess and modify postural abnormalities to attain remaining goals. []  See Plan of Care  [x]  See progress note/recertification  []  See Discharge Summary         Progress towards goals / Updated goals:  Short Term Goals: To be accomplished in 2 weeks:  1. I and compliant with HEP for self management of symptoms.   IE: issued HEP  Current: goal met   [address] be accomplished in 4 weeks:  1. Improve T/S ext by 50% to increase mobility for daily activities. IE: 25% extension  Current:25% increase   2. Increase right mandibular lateral deviation to = left to increase ease with chewing food. IE: 50% decrease compared to left  Current: goal met- equal lateral deviation   3.  Increase B scapular strength to grossly 4-/5 to improve stability for ADLs and improve postural awareness.   IE: 2+3/5 grossly   Current: 3+/5   PLAN  [x]  Upgrade activities as tolerated     []  Continue plan of care  []  Update interventions per flow sheet       []  Discharge due to:_  []  Other:_      KAVITA Dodson, CMTPT 12/10/2021  8:54 AM    Future Appointments   Date Time Provider Rory Elliott   12/10/2021 12:00 PM Pavel Fabian, PT [facility]   12/28/2021  4:40 PM Radha Ohara NP [facility] BS AMB

## 2021-12-22 ENCOUNTER — HOSPITAL ENCOUNTER (OUTPATIENT)
Dept: PHYSICAL THERAPY | Age: 26
Discharge: HOME | End: 2021-12-22
Payer: MEDICAID

## 2021-12-22 PROCEDURE — 97112 NEUROMUSCULAR REEDUCATION: CPT

## 2021-12-22 PROCEDURE — 97110 THERAPEUTIC EXERCISES: CPT

## 2021-12-22 PROCEDURE — 97530 THERAPEUTIC ACTIVITIES: CPT

## 2021-12-22 NOTE — PROGRESS NOTES
PT DAILY TREATMENT NOTE 10-18    Patient Name: Marlon Ao  Date:2021  : 1995  [x]  Patient  Verified  Payor: Tomás Valencia / Plan: [facility] MEDICAID / Product Type: Managed Care Medicaid /    In time:345  Out time:414  Total Treatment Time (min): 29  Visit #: 1 of 3    Treatment Area: Neck pain [M54.2]  TMJ (dislocation of temporomandibular joint) [S03.00XA]    SUBJECTIVE  Pain Level (0-10 scale): 0  Any medication changes, allergies to medications, adverse drug reactions, diagnosis change, or new procedure performed?: [x] No    [] Yes (see summary sheet for update)  Subjective functional status/changes:   [] No changes reported  I'm not having any pain right now. It's been more manageable. I went through that time where its was going into spasms, but it hasn't been that bad lately. OBJECTIVE      9 min Therapeutic Exercise:  [] See flow sheet :   Rationale: increase ROM and increase strength to improve the patients ability to perform daily activities      9 min Neuromuscular Re-education:  []  See flow sheet :   Rationale: increase strength  to improve the patients postural awareness     3 min Manual Therapy:  SOR; STM left SCM, scalenes, C/S paraspinals, masseter; grade 4 upper T/S mobs   The manual therapy interventions were performed at a separate and distinct time from the therapeutic activities interventions.   Rationale: decrease pain, increase ROM, increase tissue extensibility and increase postural awareness to perform ADLs  8 min Therapeutic Activity:  []  See flow sheet :   Rationale: increase ROM and increase strength  to improve the patients ability to perform ADLs    With   [] TE   [] TA   [] neuro   [] other: Patient Education: [x] Review HEP    [] Progressed/Changed HEP based on:   [] positioning   [] body mechanics   [] transfers   [] heat/ice application    [] other:      Other Objective/Functional Measures:      Pain Level (0-10 scale) post treatment: 0    ASSESSMENT/Changes in Function: Slight increase in T/s mobility. Continue for 2 visits and prepare for D/C. Cueing to slow down and control movements. Continues to flare out ribs and increase L/S lordosis to stabilize. Patient will continue to benefit from skilled PT services to modify and progress therapeutic interventions, address functional mobility deficits, address ROM deficits, address strength deficits, analyze and address soft tissue restrictions, analyze and cue movement patterns and assess and modify postural abnormalities to attain remaining goals. []  See Plan of Care  []  See progress note/recertification  []  See Discharge Summary         Progress towards goals / Updated goals:  1. Improve T/S ext by 50% to increase mobility for daily activities. PN:25% increase  2.  Increase B scapular strength to grossly 4-/5 to improve stability for ADLs and improve postural awareness.   PN: 3+/5 B    PLAN  [x]  Upgrade activities as tolerated     []  Continue plan of care  []  Update interventions per flow sheet       []  Discharge due to:_  []  Other:_      Kamran Becerra, KAVITA, CMTPT 12/22/2021  9:08 AM    Future Appointments   Date Time Provider Rory Elliott   12/22/2021  3:45 PM [facility]   12/27/2021  3:45 PM Nika Galdamez, PT [facility]   12/28/2021  4:40 PM Iliana Schaffer NP [facility] BS AMB   12/29/2021  3:45 PM [facility]

## 2021-12-27 ENCOUNTER — APPOINTMENT (OUTPATIENT)
Dept: PHYSICAL THERAPY | Age: 26
End: 2021-12-27
Payer: MEDICAID

## 2021-12-28 ENCOUNTER — VIRTUAL VISIT (OUTPATIENT)
Dept: FAMILY MEDICINE CLINIC | Age: 26
End: 2021-12-28
Payer: MEDICAID

## 2021-12-28 DIAGNOSIS — M26.609 TMJ DYSFUNCTION: ICD-10-CM

## 2021-12-28 DIAGNOSIS — M26.622 TMJ TENDERNESS, LEFT: ICD-10-CM

## 2021-12-28 DIAGNOSIS — F41.9 ANXIETY: Primary | ICD-10-CM

## 2021-12-28 PROCEDURE — 99213 OFFICE O/P EST LOW 20 MIN: CPT | Performed by: NURSE PRACTITIONER

## 2021-12-28 RX ORDER — OMEPRAZOLE 20 MG/1
20 CAPSULE, DELAYED RELEASE ORAL
Qty: 30 CAPSULE | Refills: 2 | Status: SHIPPED | OUTPATIENT
Start: 2021-12-28 | End: 2022-05-27

## 2021-12-28 RX ORDER — BUSPIRONE HYDROCHLORIDE 10 MG/1
TABLET ORAL
Qty: 101 TABLET | Refills: 2 | Status: SHIPPED | OUTPATIENT
Start: 2021-12-28 | End: 2022-03-17

## 2021-12-28 RX ORDER — HYDROCODONE BITARTRATE AND ACETAMINOPHEN 7.5; 325 MG/1; MG/1
1 TABLET ORAL
Qty: 28 TABLET | Refills: 0 | Status: SHIPPED | OUTPATIENT
Start: 2021-12-28 | End: 2022-01-04

## 2021-12-28 NOTE — PROGRESS NOTES
Denver Eli is a 27 y.o. female who was seen by synchronous (real-time) audio-video technology on 12/28/2021 for Anxiety    Assessment & Plan:   Diagnoses and all orders for this visit:    1. Anxiety    2. TMJ dysfunction  -     HYDROcodone-acetaminophen (NORCO) 7.5-325 mg per tablet; Take 1 Tablet by mouth every six (6) hours as needed for Pain for up to 7 days. Max Daily Amount: 4 Tablets. 3. TMJ tenderness, left  -     HYDROcodone-acetaminophen (NORCO) 7.5-325 mg per tablet; Take 1 Tablet by mouth every six (6) hours as needed for Pain for up to 7 days. Max Daily Amount: 4 Tablets. Other orders  -     busPIRone (BUSPAR) 10 mg tablet; Take 0.5 Tablets by mouth three (3) times daily for 7 days, THEN 1 Tablet three (3) times daily for 30 days. -     omeprazole (PRILOSEC) 20 mg capsule; Take 1 Capsule by mouth Daily (before breakfast). I spent at least 20 minutes on this visit with this established patient. 712  Subjective:   Patient states she does have scheduled dental appt with TMJ specialist on 1/6/2021 for updated scans after she completing physical therapy. Comments she completes physical therapy tomorrow. Patient also states she would like to resume her buspar for anxiety. States she felt it was slightly effective but inquires if there is another medication that is stronger if her dosage can be increased. Medication was discontinued due to being prescribed skelaxin however, she didn't begin medication due to medication not being covered by insurance. Prior to Admission medications    Medication Sig Start Date End Date Taking? Authorizing Provider   busPIRone (BUSPAR) 10 mg tablet Take 0.5 Tablets by mouth three (3) times daily for 7 days, THEN 1 Tablet three (3) times daily for 30 days. 12/28/21 2/3/22 Yes Nicholas Ramirez NP   omeprazole (PRILOSEC) 20 mg capsule Take 1 Capsule by mouth Daily (before breakfast).  12/28/21  Yes Nicholas Ramirez NP   tiZANidine (ZANAFLEX) 4 mg tablet Take 1 Tablet by mouth three (3) times daily as needed for Muscle Spasm(s). 11/11/21   Darrick NGUYEN NP   diclofenac EC (VOLTAREN) 50 mg EC tablet Take 1 Tablet by mouth two (2) times a day. With misoprostol 11/11/21   Darrick NGUYEN NP   diclofenac (VOLTAREN) 1 % gel Apply 2 g to affected area four (4) times daily as needed for Pain. 11/2/21   Antonietta Ramirez NP     There is no problem list on file for this patient. There are no problems to display for this patient. No Known Allergies  History reviewed. No pertinent past medical history. History reviewed. No pertinent surgical history. History reviewed. No pertinent family history. Social History     Tobacco Use    Smoking status: Never Smoker    Smokeless tobacco: Never Used   Substance Use Topics    Alcohol use: No       ROS    Objective:   No flowsheet data found. General: alert, cooperative, no distress   Mental  status: normal mood, behavior, speech, dress, motor activity, and thought processes, able to follow commands   HENT: NCAT   Neck: no visualized mass   Resp: no respiratory distress   Neuro: no gross deficits   Skin: no discoloration or lesions of concern on visible areas   Psychiatric: normal affect, consistent with stated mood, no evidence of hallucinations     Additional exam findings: We discussed the expected course, resolution and complications of the diagnosis(es) in detail. Medication risks, benefits, costs, interactions, and alternatives were discussed as indicated. I advised her to contact the office if her condition worsens, changes or fails to improve as anticipated. She expressed understanding with the diagnosis(es) and plan. Margy Luong was evaluated through a synchronous (real-time) audio-video encounter. The patient (or guardian if applicable) is aware that this is a billable service. Verbal consent to proceed has been obtained within the past 12 months.  The visit was conducted pursuant to the emergency declaration under the [facility], [address] [place] [address] waiver authority and the [employer] General Act. Patient identification was verified, and a caregiver was present when appropriate. The patient was located in a state where the provider was credentialed to provide care.     Gely Garcia NP

## 2021-12-29 ENCOUNTER — HOSPITAL ENCOUNTER (OUTPATIENT)
Dept: PHYSICAL THERAPY | Age: 26
Discharge: HOME | End: 2021-12-29
Payer: MEDICAID

## 2021-12-29 PROCEDURE — 97110 THERAPEUTIC EXERCISES: CPT

## 2021-12-29 PROCEDURE — 97530 THERAPEUTIC ACTIVITIES: CPT

## 2021-12-29 PROCEDURE — 97112 NEUROMUSCULAR REEDUCATION: CPT

## 2021-12-29 NOTE — PROGRESS NOTES
PT DISCHARGE DAILY NOTE AND [id number]    Patient name: Mckenna Andersen Start of Care: 21   Referral source: Mehdi Grahammaria guadalupe ALEXANDRA : 1995   Medical/Treatment Diagnosis: Neck pain [M54.2]  TMJ (dislocation of temporomandibular joint) [S03.00XA] Onset Date:chronic     Prior Hospitalization: see medical history Provider#: [id number]   Medications: Verified on Patient Summary List     Comorbidities: none reported    Prior Level of Function: PSR  [employer] work    Visits from [facility] of Care: 7    Missed Visits: 0    Reporting Period : 12/10/21 to 21    Date:2021  : 1995  [x]  Patient  Verified  Payor: [place] Rodriguez / Plan: Sarah Flores / Product Type: Managed Care Medicaid /    In time:347  Out time:415  Total Treatment Time (min): 28  Visit #: 2 of 3      SUBJECTIVE  Pain Level (0-10 scale): 0  Any medication changes, allergies to medications, adverse drug reactions, diagnosis change, or new procedure performed?: [x] No    [] Yes (see summary sheet for update)  Subjective functional status/changes:   [] No changes reported  I\"m feeling pretty good.     OBJECTIVE    8 min Therapeutic Exercise:  [] See flow sheet :   Rationale: increase strength to improve the patients ability to perform ADLs    8 min Therapeutic Activity:  []  See flow sheet :   Rationale: increase strength, improve coordination, improve balance and increase proprioception  to improve the patients ability to perform ADLs     12 min Neuromuscular Re-education:  []  See flow sheet :   Rationale: increase strength, improve coordination, improve balance and increase proprioception  to improve the patients ability to recruit ant and post chain appropriately for postural awareness and work tasks          With   [] TE   [] TA   [] neuro   [] other: Patient Education: [x] Review HEP    [] Progressed/Changed HEP based on:   [] positioning   [] body mechanics   [] transfers   [] heat/ice application    [] other:      Other Objective/Functional Measures: see below     Pain Level (0-10 scale) post treatment: 0    Summary of Care:  1. Improve T/S ext by 50% to increase mobility for daily activities. PN:25% increase  Current:50%- goal met  2. Increase B scapular strength to grossly 4-/5 to improve stability for ADLs and improve postural awareness.   PN: 3+/5 B  Current:MT 4-/5; LT3+/5- progressing   ASSESSMENT/Changes in Function: Patient is progressing well and would like to continue HEP for maintenance. Thank you for this referral!      PLAN  [x]Discontinue therapy: [x]Patient has reached or is progressing toward set goals      []Patient is non-compliant or has abdicated      []Due to lack of appreciable progress towards set [address], PT 12/29/2021  3:12 PM      NOTE TO PHYSICIAN:  Please complete the following and fax to: [facility] at [phone]  . Retain this original for your records. If you are unable to process this request in   24 hours, please contact our office.      [] I have read the above report and request that my patient continue therapy with the following changes/special instructions:  [] I have read the above report and request that my patient be discharged from therapy    Physician's Signature:____________Date:_________TIME:________     Paul Comment, DMD  ** Signature, Date and Time must be completed for valid certification **

## 2022-03-17 RX ORDER — DICLOFENAC SODIUM 50 MG/1
TABLET, DELAYED RELEASE ORAL
Qty: 60 TABLET | Refills: 1 | Status: SHIPPED | OUTPATIENT
Start: 2022-03-17 | End: 2022-05-27

## 2022-03-17 RX ORDER — BUSPIRONE HYDROCHLORIDE 10 MG/1
TABLET ORAL
Qty: 101 TABLET | Refills: 2 | Status: SHIPPED | OUTPATIENT
Start: 2022-03-17 | End: 2022-06-23

## 2022-05-27 RX ORDER — DICLOFENAC SODIUM 50 MG/1
TABLET, DELAYED RELEASE ORAL
Qty: 60 TABLET | Refills: 1 | Status: SHIPPED | OUTPATIENT
Start: 2022-05-27 | End: 2022-06-07

## 2022-05-27 RX ORDER — OMEPRAZOLE 20 MG/1
CAPSULE, DELAYED RELEASE ORAL
Qty: 30 CAPSULE | Refills: 2 | Status: SHIPPED | OUTPATIENT
Start: 2022-05-27 | End: 2022-06-07

## 2022-06-07 ENCOUNTER — OFFICE VISIT (OUTPATIENT)
Dept: FAMILY MEDICINE CLINIC | Age: 27
End: 2022-06-07
Payer: COMMERCIAL

## 2022-06-07 VITALS
TEMPERATURE: 98.6 F | SYSTOLIC BLOOD PRESSURE: 105 MMHG | RESPIRATION RATE: 18 BRPM | WEIGHT: 123.6 LBS | HEIGHT: 59 IN | OXYGEN SATURATION: 99 % | HEART RATE: 80 BPM | BODY MASS INDEX: 24.92 KG/M2 | DIASTOLIC BLOOD PRESSURE: 59 MMHG

## 2022-06-07 DIAGNOSIS — F32.0 MAJOR DEPRESSIVE DISORDER, SINGLE EPISODE, MILD (HCC): ICD-10-CM

## 2022-06-07 DIAGNOSIS — N76.0 ACUTE VAGINITIS: ICD-10-CM

## 2022-06-07 DIAGNOSIS — Z83.49 FAMILY HISTORY OF THYROID DISEASE IN MOTHER: Primary | ICD-10-CM

## 2022-06-07 PROCEDURE — 99213 OFFICE O/P EST LOW 20 MIN: CPT | Performed by: NURSE PRACTITIONER

## 2022-06-07 RX ORDER — FLUCONAZOLE 150 MG/1
150 TABLET ORAL
Qty: 2 TABLET | Refills: 0 | Status: SHIPPED | OUTPATIENT
Start: 2022-06-07 | End: 2022-06-15

## 2022-06-07 RX ORDER — METRONIDAZOLE 500 MG/1
500 TABLET ORAL 2 TIMES DAILY
Qty: 14 TABLET | Refills: 0 | Status: SHIPPED | OUTPATIENT
Start: 2022-06-07 | End: 2022-06-14

## 2022-06-07 RX ORDER — TIZANIDINE 4 MG/1
4 TABLET ORAL
Qty: 60 TABLET | Refills: 2 | Status: SHIPPED | OUTPATIENT
Start: 2022-06-07

## 2022-06-07 NOTE — PATIENT INSTRUCTIONS
[facility], [address]  [phone]    Thyroid Hormone Tests: About This Test  What are thyroid hormone tests? Thyroid hormone tests are blood tests that check how well your thyroid gland is working. The thyroid gland is a butterfly-shaped gland that lies in front of your windpipe (trachea), just below your voice box (larynx). The thyroid gland makes hormones that control the way your body uses energy (metabolism). This test will give your doctor information about your thyroid hormone levels. You may have hyperthyroidism when your thyroid gland makes too much thyroid hormone. You may have hypothyroidism when the gland does not make enough thyroid hormone. Why are these tests done? Thyroid hormone tests are done to find the cause of an abnormal thyroid-stimulating hormone (TSH) test. TSH tests can also check how well treatment for thyroid disease is working. They are used in newborns to find out if the thyroid gland is working as it should. How do you prepare for the test?  If you are taking thyroid medicines, tell your doctor when you took your last dose. You may need to stop taking thyroid medicines for a short time before having this test.  How is the test done? Blood test  A health professional uses a needle to take a blood sample, usually from the arm. Heel stick  A heel stick is used to get a blood sample from a baby. The baby's heel is poked, and several drops of blood are collected. The baby may have a tiny bruise where the heel was poked. What happens after the test?  · You will probably be able to go home right away. · You can go back to your usual activities right away. When should you call for help? Watch closely for changes in your health, and be sure to contact your doctor if you have any questions about this test.  Follow-up care is a key part of your treatment and safety.  Be sure to make and go to all appointments, and call your doctor if you are having problems. It's also a good idea to keep a list of the medicines you take. Ask your doctor when you can expect to have your test results. Where can you learn more? Go to http://www.gray.com/  Enter H865 in the search box to learn more about \"Thyroid Hormone Tests: About This Test.\"  Current as of: July 28, 2021               Content Version: 13.2  © 2006-2022 [employer]. Care instructions adapted under license by [employer] (which disclaims liability or warranty for this information). If you have questions about a medical condition or this instruction, always ask your healthcare professional. [place] [address] any warranty or liability for your use of this information.

## 2022-06-07 NOTE — PROGRESS NOTES
1. \"Have you been to the ER, urgent care clinic since your last visit? Hospitalized since your last visit? \" [facility] ER    2. \"Have you seen or consulted any other health care providers outside of the [address] [place] [address] since your last visit? \" [facility] ER     3. For patients aged 39-70: Has the patient had a colonoscopy / FIT/ Cologuard? NA - based on age      If the patient is female:    4. For patients aged 41-77: Has the patient had a mammogram within the past 2 years? NA - based on age or sex      11. For patients aged 21-65: Has the patient had a pap smear?  No

## 2022-06-07 NOTE — PROGRESS NOTES
Kristan Rosado is a 24 y.o. female who was seen in clinic today (6/7/2022) for Thyroid Problem    Assessment & Plan:   Diagnoses and all orders for this visit:    1. Family history of thyroid disease in mother  -     TSH 3RD GENERATION; Future  -     T4, FREE; Future  -     T3 TOTAL; Future  -     THYROID ANTIBODY PANEL; Future  -     CBC WITH AUTOMATED DIFF; Future    2. Major depressive disorder, single episode, mild ([facility] [address]  Assessment & Plan:   asymptomatic, continue current treatment plan        3. Acute vaginitis    Other orders  -     tiZANidine (ZANAFLEX) 4 mg tablet; Take 1 Tablet by mouth three (3) times daily as needed for Muscle Spasm(s). -     fluconazole (DIFLUCAN) 150 mg tablet; Take 1 Tablet by mouth every seven (7) days for 2 doses. -     metroNIDAZOLE (FLAGYL) 500 mg tablet; Take 1 Tablet by mouth two (2) times a day for 7 days. Advised will treat empirically for vaginitis. Will need to schedule visit if no improvement in symptoms. I have discussed the diagnosis with the patient and the intended plan as seen in the above orders. The patient has received an after-visit summary and questions were answered concerning future plans. I have discussed medication side effects and warnings with the patient as well. Patient agreeable with above plan and verbalizes understanding. Follow-up and Dispositions    · Return if symptoms worsen or fail to improve. Subjective:   Patient states she has a family history of hypothyroidism on maternal side of the family. Patient does admit to fatigue at times. Denies any changes in hair/skin/nails. Further denies any abnormal weight changes. Patient states she was treated for a UTI last Saturday(5/28/2022) with Keflex. States she has been experiencing vaginal itching, cottage cheese discharge. Reports she does tend to get yeast infections and also BV after taking antibiotics. Reports urinary symptoms have resolved.     12/28/2021 virtual visit  Dion Coppola is a 27 y.o. female who was seen by synchronous (real-time) audio-video technology on 12/28/2021 for Anxiety    Subjective:   Patient states she does have scheduled dental appt with TMJ specialist on 1/6/2021 for updated scans after she completing physical therapy. Comments she completes physical therapy tomorrow. Patient also states she would like to resume her buspar for anxiety. States she felt it was slightly effective but inquires if there is another medication that is stronger if her dosage can be increased. Medication was discontinued due to being prescribed skelaxin however, she didn't begin medication due to medication not being covered by insurance. Assessment & Plan:   Diagnoses and all orders for this visit:    1. Anxiety    2. TMJ dysfunction  -     HYDROcodone-acetaminophen (NORCO) 7.5-325 mg per tablet; Take 1 Tablet by mouth every six (6) hours as needed for Pain for up to 7 days. Max Daily Amount: 4 Tablets. 3. TMJ tenderness, left  -     HYDROcodone-acetaminophen (NORCO) 7.5-325 mg per tablet; Take 1 Tablet by mouth every six (6) hours as needed for Pain for up to 7 days. Max Daily Amount: 4 Tablets. Other orders  -     busPIRone (BUSPAR) 10 mg tablet; Take 0.5 Tablets by mouth three (3) times daily for 7 days, THEN 1 Tablet three (3) times daily for 30 days. -     omeprazole (PRILOSEC) 20 mg capsule; Take 1 Capsule by mouth Daily (before breakfast). I spent at least 20 minutes on this visit with this established patient.     Lab Results   Component Value Date/Time    Sodium 143 03/08/2011 02:25 PM    Potassium 4.3 03/08/2011 02:25 PM    Chloride 104 03/08/2011 02:25 PM    CO2 31 03/08/2011 02:25 PM    Anion gap 8 03/08/2011 02:25 PM    Glucose 118 (H) 03/08/2011 02:25 PM    BUN 14 03/08/2011 02:25 PM    Creatinine 0.7 03/08/2011 02:25 PM    BUN/Creatinine ratio 20 03/08/2011 02:25 PM    GFR est AA >60 03/08/2011 02:25 PM    GFR est non-AA >60 03/08/2011 02:25 PM    Calcium 9.6 03/08/2011 02:25 PM    Bilirubin, total 0.2 03/08/2011 02:25 PM    Alk. phosphatase 117 03/08/2011 02:25 PM    Protein, total 8.0 03/08/2011 02:25 PM    Albumin 4.4 03/08/2011 02:25 PM    Globulin 3.6 03/08/2011 02:25 PM    A-G Ratio 1.2 03/08/2011 02:25 PM    ALT (SGPT) 27 (L) 03/08/2011 02:25 PM    AST (SGOT) 15 03/08/2011 02:25 PM     Lab Results   Component Value Date/Time    WBC 12.2 03/08/2011 02:25 PM    HGB 13.4 03/08/2011 02:25 PM    HCT 40.6 03/08/2011 02:25 PM    PLATELET 543 34/73/2352 02:25 PM    MCV 87.5 03/08/2011 02:25 PM     Wt Readings from Last 3 Encounters:   11/02/21 127 lb (57.6 kg)   04/13/21 118 lb (53.5 kg)   01/04/21 113 lb (51.3 kg)     Temp Readings from Last 3 Encounters:   11/02/21 97.8 °F (36.6 °C) (Temporal)   04/13/21 98 °F (36.7 °C) (Temporal)   03/02/15 97.5 °F (36.4 °C)     BP Readings from Last 3 Encounters:   11/02/21 118/73   04/13/21 112/69   03/02/15 132/85 (98 %, Z = 2.05 /  98 %, Z = 2.05)*     *BP percentiles are based on the 2017 AAP Clinical Practice Guideline for girls     Pulse Readings from Last 3 Encounters:   11/02/21 82   04/13/21 69   03/02/15 79     Prior to Admission medications    Medication Sig Start Date End Date Taking? Authorizing Provider   omeprazole (PRILOSEC) 20 mg capsule take 1 capsule by mouth once daily before breakfast 5/27/22   Maliha NGUYEN NP   diclofenac EC (VOLTAREN) 50 mg EC tablet take 1 tablet by mouth twice a day WITH MISOPROSTOL 5/27/22   Maliha NGUYEN NP   busPIRone (BUSPAR) 10 mg tablet take 0.5 tablets by mouth three times a day for 7 days then 1 tablet by mouth three times a day for 30 DAYS 3/17/22   Maliha NGUYEN NP   tiZANidine (ZANAFLEX) 4 mg tablet Take 1 Tablet by mouth three (3) times daily as needed for Muscle Spasm(s). 11/11/21   Maliha NGUYEN NP   diclofenac (VOLTAREN) 1 % gel Apply 2 g to affected area four (4) times daily as needed for Pain.  11/2/21   Hermilo Perez NP     The following sections were reviewed & updated as appropriate: PMH, PSH, FH, and SH. Review of Systems   Constitutional: Positive for fatigue (intermittently). Negative for activity change, appetite change, chills and fever. Respiratory: Negative for chest tightness and shortness of breath. Cardiovascular: Negative for chest pain. Neurological: Negative for dizziness and headaches. Objective:     Visit Vitals  BP (!) 105/59 (BP 1 Location: Left upper arm, BP Patient Position: Sitting, BP Cuff Size: Adult)   Pulse 80   Temp 98.6 °F (37 °C) (Temporal)   Resp 18   Ht 4' 11\" (1.499 m)   Wt 123 lb 9.6 oz (56.1 kg)   SpO2 99%   BMI 24.96 kg/m²      Physical Exam  Constitutional:       General: She is not in acute distress. Appearance: She is well-developed. HENT:      Head: Normocephalic and atraumatic. Neck:      Thyroid: No thyromegaly or thyroid tenderness. Vascular: No carotid bruit. Cardiovascular:      Rate and Rhythm: Normal rate and regular rhythm. Heart sounds: Normal heart sounds. No murmur heard. No friction rub. No gallop. Pulmonary:      Effort: Pulmonary effort is normal.      Breath sounds: Normal breath sounds. No decreased breath sounds, wheezing, rhonchi or rales. Musculoskeletal:      Cervical back: Normal range of motion and neck supple. Lymphadenopathy:      Cervical: No cervical adenopathy. Skin:     General: Skin is warm and dry. Neurological:      Mental Status: She is alert and oriented to person, place, and time. Disclaimer: The patient understands our medical plan. Alternatives have been explained and offered. The risks, benefits and significant side effects of all medications have been reviewed. Anticipated time course and progression of condition reviewed. All questions have been addressed. She is encouraged to employ the information provided in the after visit summary, which was reviewed.       Where applicable, she is instructed to call the clinic if she has not been notified either by phone or through [address] with the results of her tests or with an appointment plan for any referrals within 1 week(s). No news is not good news; it's no news. The patient  is to call if her condition worsens or fails to improve or if significant side effects are experienced. Aspects of this note may have been generated using voice recognition software. Despite editing, there may be unrecognized errors.        Hunter Norton, NP

## 2022-06-22 ENCOUNTER — HOSPITAL ENCOUNTER (OUTPATIENT)
Dept: LAB | Age: 27
Discharge: HOME | End: 2022-06-22

## 2022-06-22 PROCEDURE — 99001 SPECIMEN HANDLING PT-LAB: CPT

## 2022-06-23 LAB
ABSOLUTE LYMPHOCYTE COUNT, 10803: 1.6 K/UL (ref 1–4.8)
BASOPHILS # BLD: 0 K/UL (ref 0–0.2)
BASOPHILS NFR BLD: 0 % (ref 0–2)
EOSINOPHIL # BLD: 0.1 K/UL (ref 0–0.5)
EOSINOPHIL NFR BLD: 2 % (ref 0–6)
ERYTHROCYTE [DISTWIDTH] IN BLOOD BY AUTOMATED COUNT: 12 % (ref 10–15.5)
GRANULOCYTES,GRANS: 66 % (ref 40–75)
HCT VFR BLD AUTO: 40.7 % (ref 35.1–46.5)
HGB BLD-MCNC: 12.3 G/DL (ref 11.7–15.5)
LYMPHOCYTES, LYMLT: 23 % (ref 20–45)
MCH RBC QN AUTO: 28 PG (ref 26–34)
MCHC RBC AUTO-ENTMCNC: 30 G/DL (ref 31–36)
MCV RBC AUTO: 94 FL (ref 80–99)
MONOCYTES # BLD: 0.6 K/UL (ref 0.1–1)
MONOCYTES NFR BLD: 8 % (ref 3–12)
NEUTROPHILS # BLD AUTO: 4.5 K/UL (ref 1.8–7.7)
PLATELET # BLD AUTO: 243 K/UL (ref 140–440)
PMV BLD AUTO: 12.1 FL (ref 9–13)
RBC # BLD AUTO: 4.35 M/UL (ref 3.8–5.2)
T3 TOTAL,T3LT: 126 NG/DL (ref 80–200)
T4 FREE SERPL-MCNC: 1.5 NG/DL (ref 0.9–1.8)
TSH SERPL DL<=0.005 MIU/L-ACNC: 1.03 MCU/ML (ref 0.27–4.2)
WBC # BLD AUTO: 6.8 K/UL (ref 4–11)

## 2022-06-23 RX ORDER — BUSPIRONE HYDROCHLORIDE 10 MG/1
TABLET ORAL
Qty: 101 TABLET | Refills: 2 | Status: SHIPPED | OUTPATIENT
Start: 2022-06-23 | End: 2022-10-15

## 2022-06-24 LAB
THYROGLOBULIN AB,1830278241: <1 IU/ML (ref 0–0.9)
TPO AB,TMCAB: <8 IU/ML (ref 0–34)

## 2022-06-29 LAB — SENTARA SPECIMEN COL,SENBCF: NORMAL

## 2022-07-05 RX ORDER — MELOXICAM 15 MG/1
15 TABLET ORAL
Qty: 30 TABLET | Refills: 2 | Status: SHIPPED | OUTPATIENT
Start: 2022-07-05

## 2022-07-15 ENCOUNTER — VIRTUAL VISIT (OUTPATIENT)
Dept: FAMILY MEDICINE CLINIC | Age: 27
End: 2022-07-15
Payer: COMMERCIAL

## 2022-07-15 DIAGNOSIS — M26.609 TMJ DYSFUNCTION: Primary | ICD-10-CM

## 2022-07-15 PROCEDURE — 99212 OFFICE O/P EST SF 10 MIN: CPT | Performed by: NURSE PRACTITIONER

## 2022-07-15 NOTE — PROGRESS NOTES
Kalpesh Chou is a 24 y.o. female who was seen by synchronous (real-time) audio-video technology on 7/15/2022 for Jaw Pain (TMJ follow up since beginning mobic)    Assessment & Plan:     Diagnoses and all orders for this visit:    1. TMJ dysfunction      Above stable continue meloxicam and zanaflex as prescribed    Follow-up and Dispositions    · Return in about 6 months (around 1/15/2023) for anxiety/TMJ, in office follow up. Routing History       I spent at least 10 minutes on this visit with this established patient. 712  Subjective:   Patient reports she has been taking meloxicam as prescribed without any adverse effects. States she hasn't been experiencing as much pain and the muscle spasms she has had aren't severe. 6/7/2022  Kalpesh Chou is a 24 y.o. female who was seen in clinic today (6/7/2022) for Thyroid Problem    Subjective:   Patient states she has a family history of hypothyroidism on maternal side of the family. Patient does admit to fatigue at times. Denies any changes in hair/skin/nails. Further denies any abnormal weight changes. Patient states she was treated for a UTI last Saturday(5/28/2022) with Keflex. States she has been experiencing vaginal itching, cottage cheese discharge. Reports she does tend to get yeast infections and also BV after taking antibiotics. Reports urinary symptoms have resolved. Assessment & Plan:   Diagnoses and all orders for this visit:    1. Family history of thyroid disease in mother  -     TSH 3RD GENERATION; Future  -     T4, FREE; Future  -     T3 TOTAL; Future  -     THYROID ANTIBODY PANEL; Future  -     CBC WITH AUTOMATED DIFF; Future    2. Major depressive disorder, single episode, mild ([facility] [address]  Assessment & Plan:   asymptomatic, continue current treatment plan    3. Acute vaginitis    Other orders  -     tiZANidine (ZANAFLEX) 4 mg tablet; Take 1 Tablet by mouth three (3) times daily as needed for Muscle Spasm(s).   -     fluconazole (DIFLUCAN) 150 mg tablet; Take 1 Tablet by mouth every seven (7) days for 2 doses. -     metroNIDAZOLE (FLAGYL) 500 mg tablet; Take 1 Tablet by mouth two (2) times a day for 7 days. Advised will treat empirically for vaginitis. Will need to schedule visit if no improvement in symptoms. I have discussed the diagnosis with the patient and the intended plan as seen in the above orders. The patient has received an after-visit summary and questions were answered concerning future plans. I have discussed medication side effects and warnings with the patient as well. Patient agreeable with above plan and verbalizes understanding. Follow-up and Dispositions    · Return if symptoms worsen or fail to improve. 12/28/2021 virtual visit  Yazmin Navarrete is a 27 y.o. female who was seen by synchronous (real-time) audio-video technology on 12/28/2021 for Anxiety    Subjective:   Patient states she does have scheduled dental appt with TMJ specialist on 1/6/2021 for updated scans after she completing physical therapy. Comments she completes physical therapy tomorrow. Patient also states she would like to resume her buspar for anxiety. States she felt it was slightly effective but inquires if there is another medication that is stronger if her dosage can be increased. Medication was discontinued due to being prescribed skelaxin however, she didn't begin medication due to medication not being covered by insurance. Assessment & Plan:   Diagnoses and all orders for this visit:    1. Anxiety    2. TMJ dysfunction  -     HYDROcodone-acetaminophen (NORCO) 7.5-325 mg per tablet; Take 1 Tablet by mouth every six (6) hours as needed for Pain for up to 7 days. Max Daily Amount: 4 Tablets. 3. TMJ tenderness, left  -     HYDROcodone-acetaminophen (NORCO) 7.5-325 mg per tablet; Take 1 Tablet by mouth every six (6) hours as needed for Pain for up to 7 days. Max Daily Amount: 4 Tablets.     Other orders  -     busPIRone (BUSPAR) 10 mg tablet; Take 0.5 Tablets by mouth three (3) times daily for 7 days, THEN 1 Tablet three (3) times daily for 30 days. -     omeprazole (PRILOSEC) 20 mg capsule; Take 1 Capsule by mouth Daily (before breakfast). I spent at least 20 minutes on this visit with this established patient. Prior to Admission medications    Medication Sig Start Date End Date Taking? Authorizing Provider   meloxicam (MOBIC) 15 mg tablet Take 1 Tablet by mouth daily as needed for Pain (take with food). 7/5/22   Kendall NGUYEN NP   busPIRone (BUSPAR) 10 mg tablet take 0.5 tablets by mouth three times a day for 7 days then 1 tablet by mouth three times a day for 30 DAYS 6/23/22   Kendall NGUYEN NP   tiZANidine (ZANAFLEX) 4 mg tablet Take 1 Tablet by mouth three (3) times daily as needed for Muscle Spasm(s). 6/7/22   Kendall NGUYEN NP   diclofenac (VOLTAREN) 1 % gel Apply 2 g to affected area four (4) times daily as needed for Pain. 11/2/21   Silvester Romberg, NP     Patient Active Problem List   Diagnosis Code    Major depressive disorder, single episode, mild ([facility]) F32.0     Patient Active Problem List    Diagnosis Date Noted    [facility], [facility] 06/07/2022     Current Outpatient Medications   Medication Sig Dispense Refill    meloxicam (MOBIC) 15 mg tablet Take 1 Tablet by mouth daily as needed for Pain (take with food). 30 Tablet 2    busPIRone (BUSPAR) 10 mg tablet take 0.5 tablets by mouth three times a day for 7 days then 1 tablet by mouth three times a day for 30 DAYS 101 Tablet 2    tiZANidine (ZANAFLEX) 4 mg tablet Take 1 Tablet by mouth three (3) times daily as needed for Muscle Spasm(s). 60 Tablet 2    diclofenac (VOLTAREN) 1 % gel Apply 2 g to affected area four (4) times daily as needed for Pain. 100 g 2     No Known Allergies  No past medical history on file. No past surgical history on file. No family history on file.   Social History     Tobacco Use    Smoking status: Never Smoker    Smokeless tobacco: Never Used   Substance Use Topics    Alcohol use: No       ROS    Objective:   No flowsheet data found. General: alert, cooperative, no distress   Mental  status: normal mood, behavior, speech, dress, motor activity, and thought processes, able to follow commands   HENT: NCAT   Neck: no visualized mass   Resp: no respiratory distress   Neuro: no gross deficits   Skin: no discoloration or lesions of concern on visible areas   Psychiatric: normal affect, consistent with stated mood, no evidence of hallucinations     Additional exam findings: We discussed the expected course, resolution and complications of the diagnosis(es) in detail. Medication risks, benefits, costs, interactions, and alternatives were discussed as indicated. I advised her to contact the office if her condition worsens, changes or fails to improve as anticipated. She expressed understanding with the diagnosis(es) and plan. Krish Taveras, was evaluated through a synchronous (real-time) audio-video encounter. The patient (or guardian if applicable) is aware that this is a billable service, which includes applicable co-pays. This Virtual Visit was conducted with patient's (and/or legal guardian's) consent. The visit was conducted pursuant to the emergency declaration under the [facility], [address] [place] [address] wa[facility], Inc[facility] authority and the [employer] and [employer] General Act. Patient identification was verified, and a caregiver was present when appropriate. The patient was located at: Home: [address] [address]  The provider was located at:  Facility (Appt Department): [address] [place] [address]  [facility] [address] M[address] NP

## 2022-10-11 RX ORDER — BUSPIRONE HYDROCHLORIDE 10 MG/1
TABLET ORAL
Qty: 101 TABLET | Refills: 2 | Status: CANCELLED | OUTPATIENT
Start: 2022-10-11

## 2022-10-11 NOTE — TELEPHONE ENCOUNTER
Duplicate      For Pharmacy [facility] in place:   Recommendation Provided To:    Intervention Detail: Discontinued Rx: 1, reason: Duplicate Therapy  Gap Closed?:   Intervention Accepted By:   Time Spent (min): 5

## 2022-10-11 NOTE — TELEPHONE ENCOUNTER
Last Visit: 7/15/22 with NP Avni Ward  Next Appointment: 1/18/23 with NP Avni Ward  Previous Refill Encounter(s): 6/23/22 #101 with 2 refills    Requested Prescriptions     Pending Prescriptions Disp Refills    busPIRone (BUSPAR) 10 mg tablet [Pharmacy Med Name: BUSPIRONE HCL 10 MG TABLET] 270 Tablet 1     Sig: Take 1 Tablet by mouth three (3) times daily. For [address] [facility] in place:   Recommendation Provided To:    Intervention Detail: New Rx: 1, reason: Patient Preference  Gap Closed?:   Intervention Accepted By:   Time Spent (min): 5

## 2022-10-15 RX ORDER — BUSPIRONE HYDROCHLORIDE 10 MG/1
10 TABLET ORAL 3 TIMES DAILY
Qty: 270 TABLET | Refills: 1 | Status: SHIPPED | OUTPATIENT
Start: 2022-10-15

## 2022-12-18 RX ORDER — TIZANIDINE 4 MG/1
TABLET ORAL
Qty: 60 TABLET | Refills: 2 | Status: SHIPPED | OUTPATIENT
Start: 2022-12-18

## 2023-01-20 RX ORDER — MELOXICAM 15 MG/1
TABLET ORAL
Qty: 30 TABLET | Refills: 2 | Status: SHIPPED | OUTPATIENT
Start: 2023-01-20

## 2023-02-02 ENCOUNTER — VIRTUAL VISIT (OUTPATIENT)
Dept: FAMILY MEDICINE CLINIC | Age: 28
End: 2023-02-02

## 2023-02-02 NOTE — PROGRESS NOTES
Jennifer Shen is a 24 y.o. female who was seen by synchronous (real-time) audio-video technology on 2/2/2023 for Error    Erroneous encounter disregard    Assessment & Plan:     Subjective:       Prior to Admission medications    Medication Sig Start Date End Date Taking? Authorizing Provider   meloxicam (MOBIC) 15 mg tablet take 1 tablet by mouth once daily with food AS NEEDED FOR PAIN 1/20/23  Yes Delana Landau, NP   tiZANidine (ZANAFLEX) 4 mg tablet take 1 tablet by mouth three times a day if needed for muscle spasm 12/18/22  Yes Delana Landau, NP   busPIRone (BUSPAR) 10 mg tablet Take 1 Tablet by mouth three (3) times daily. 10/15/22  Yes Delana Landau, NP   diclofenac (VOLTAREN) 1 % gel Apply 2 g to affected area four (4) times daily as needed for Pain. 11/2/21  Yes Delana Landau, NP     Patient Active Problem List   Diagnosis Code    Major depressive disorder, single episode, mild ([facility]) F32.0     Patient Active Problem List    Diagnosis Date Noted    Major depressive disorder, single episode, mild ([facility] 06/07/2022     Current Outpatient Medications   Medication Sig Dispense Refill    meloxicam (MOBIC) 15 mg tablet take 1 tablet by mouth once daily with food AS NEEDED FOR PAIN 30 Tablet 2    tiZANidine (ZANAFLEX) 4 mg tablet take 1 tablet by mouth three times a day if needed for muscle spasm 60 Tablet 2    busPIRone (BUSPAR) 10 mg tablet Take 1 Tablet by mouth three (3) times daily. 270 Tablet 1    diclofenac (VOLTAREN) 1 % gel Apply 2 g to affected area four (4) times daily as needed for Pain. 100 g 2     No Known Allergies  History reviewed. No pertinent past medical history. History reviewed. No pertinent surgical history. History reviewed. No pertinent family history. Social History     Tobacco Use    Smoking status: Never    Smokeless tobacco: Never   Substance Use Topics    Alcohol use: No       ROS    Objective:   No flowsheet data found.    General: alert, cooperative, no distress   Mental status: normal mood, behavior, speech, dress, motor activity, and thought processes, able to follow commands   HENT: NCAT   Neck: no visualized mass   Resp: no respiratory distress   Neuro: no gross deficits   Skin: no discoloration or lesions of concern on visible areas   Psychiatric: normal affect, consistent with stated mood, no evidence of hallucinations     Additional exam findings: We discussed the expected course, resolution and complications of the diagnosis(es) in detail. Medication risks, benefits, costs, interactions, and alternatives were discussed as indicated. I advised her to contact the office if her condition worsens, changes or fails to improve as anticipated. She expressed understanding with the diagnosis(es) and plan. Criss Malloy, was evaluated through a synchronous (real-time) audio-video encounter. The patient (or guardian if applicable) is aware that this is a billable service, which includes applicable co-pays. This Virtual Visit was conducted with patient's (and/or legal guardian's) consent. The visit was conducted pursuant to the emergency declaration under the [address] [place] [address] wa[facility] for Children authority and the [employer] and [email or web address] General Act. Patient identification was verified, and a caregiver was present when appropriate. The patient was located at: Home:   The provider was located at:  Facility (Appt Department):         Miguelito Monroe NP

## 2023-02-02 NOTE — PROGRESS NOTES
Lexi Sanches (: 1995) is a 24 y.o. female, established patient, here for evaluation of the following chief complaint(s): Anxiety and Depression         Lexi Sanches, was evaluated through a synchronous (real-time) audio-video encounter. The patient (or guardian if applicable) is aware that this is a billable service, which includes applicable co-pays. This Virtual Visit was conducted with patient's (and/or legal guardian's) consent. The visit was conducted pursuant to the emergency declaration under the [address] [place] [address], [address] [place] [address] authority and the [employer] and [employer] Act. Patient identification was verified, and a caregiver was present when appropriate. The patient was located at: Home: [address] [address]  The provider was located at: Facility (Appt Department): [address] [place] [address] Rd  [address]       An [address] [facility] was used to authenticate this note.   -- Glenda Dan

## 2023-03-08 ENCOUNTER — TELEMEDICINE (OUTPATIENT)
Age: 28
End: 2023-03-08

## 2023-03-08 DIAGNOSIS — Z91.199 NO-SHOW FOR APPOINTMENT: Primary | ICD-10-CM

## 2023-03-08 NOTE — PROGRESS NOTES
Pricilla Vaca is a 24 y.o. (1995) female is a Established patient, who was seen by synchronous (real-time) audio-video technology on 3/8/2023 for evaluation of the following:   Chief Complaint   Patient presents with    No Show     Doxy link x2 sent and email sent x1 without response. Assessment & Plan:   Angelica Kitchen was seen today for no show. Diagnoses and all orders for this visit:    No-show for appointment    No follow-ups on file. Subjective:       Prior to Admission medications    Medication Sig Start Date End Date Taking? Authorizing Provider   busPIRone (BUSPAR) 10 MG tablet Take 10 mg by mouth 3 times daily 10/15/22   Ar Automatic Reconciliation   diclofenac sodium (VOLTAREN) 1 % GEL Apply 2 g topically 4 times daily as needed 11/2/21   Ar Automatic Reconciliation   meloxicam (MOBIC) 15 MG tablet Take 15 mg by mouth daily as needed 7/5/22   Ar Automatic Reconciliation   tiZANidine (ZANAFLEX) 4 MG tablet take 1 tablet by mouth three times a day if needed for muscle spasm 12/18/22   Ar Automatic Reconciliation     Not on File        Review of Systems        Objective: There were no vitals taken for this visit. General: alert, cooperative, no distress   Mental  status: normal mood, behavior, speech, dress, motor activity, and thought processes, able to follow commands   HENT: NCAT   Neck: no visualized mass   Resp: no respiratory distress   Neuro: no gross deficits   Skin: no discoloration or lesions of concern on visible areas   Psychiatric: normal affect, consistent with stated mood, no evidence of hallucinations     Additional exam findings: We discussed the expected course, resolution and complications of the diagnosis(es) in detail. Medication risks, benefits, costs, interactions, and alternatives were discussed as indicated. I advised her to contact the office if her condition worsens, changes or fails to improve as anticipated.  She expressed understanding with the diagnosis(es) and plan.     Consent: Pricilla Vaca, who was seen by synchronous (real-time) audio-video technology, and/or her healthcare decision maker, is aware that this patient-initiated, Telehealth encounter on 3/8/2023 is a billable service, with coverage as determined by her insurance carrier. She is aware that she may receive a bill and has provided verbal consent to proceed:     Pricilla Vaca, was evaluated through a synchronous (real-time) audio-video encounter. The patient (or guardian if applicable) is aware that this is a billable service, which includes applicable co-pays. This Virtual Visit was conducted with patient's (and/or legal guardian's) consent. The visit was conducted pursuant to the emergency declaration under the [address] [place] [address] [facility] authority and the [employer] Act. Patient identification was verified, and a caregiver was present when appropriate. The patient was located at   Provider was located at          [address]  An electronic signature was used to authenticate this note.

## 2023-04-03 NOTE — TELEPHONE ENCOUNTER
This patient contacted office for the following prescriptions to be filled:    Last office visit: 6/7/22  Follow up appointment: 4/14/23   Medication requested: busPIRone (BUSPAR) 10 MG tablet  Last fill date: 10/15/22    Pharmacy: Ellen Timmons  [address] [facility], [address] [place] [id number]

## 2023-04-06 RX ORDER — BUSPIRONE HYDROCHLORIDE 10 MG/1
10 TABLET ORAL 3 TIMES DAILY
Qty: 270 TABLET | Refills: 1 | Status: SHIPPED | OUTPATIENT
Start: 2023-04-06

## 2023-04-14 ENCOUNTER — OFFICE VISIT (OUTPATIENT)
Age: 28
End: 2023-04-14
Payer: COMMERCIAL

## 2023-04-14 VITALS
HEART RATE: 92 BPM | OXYGEN SATURATION: 97 % | SYSTOLIC BLOOD PRESSURE: 119 MMHG | RESPIRATION RATE: 20 BRPM | BODY MASS INDEX: 25.72 KG/M2 | TEMPERATURE: 98.6 F | HEIGHT: 59 IN | WEIGHT: 127.6 LBS | DIASTOLIC BLOOD PRESSURE: 75 MMHG

## 2023-04-14 DIAGNOSIS — M26.622 TMJ TENDERNESS, LEFT: ICD-10-CM

## 2023-04-14 DIAGNOSIS — F32.A ANXIETY AND DEPRESSION: Primary | ICD-10-CM

## 2023-04-14 DIAGNOSIS — F32.0 MAJOR DEPRESSIVE DISORDER, SINGLE EPISODE, MILD (HCC): ICD-10-CM

## 2023-04-14 DIAGNOSIS — F41.9 ANXIETY AND DEPRESSION: Primary | ICD-10-CM

## 2023-04-14 PROCEDURE — 99213 OFFICE O/P EST LOW 20 MIN: CPT | Performed by: NURSE PRACTITIONER

## 2023-04-14 SDOH — ECONOMIC STABILITY: FOOD INSECURITY: WITHIN THE PAST 12 MONTHS, THE FOOD YOU BOUGHT JUST DIDN'T LAST AND YOU DIDN'T HAVE MONEY TO GET MORE.: NEVER TRUE

## 2023-04-14 SDOH — ECONOMIC STABILITY: FOOD INSECURITY: WITHIN THE PAST 12 MONTHS, YOU WORRIED THAT YOUR FOOD WOULD RUN OUT BEFORE YOU GOT MONEY TO BUY MORE.: NEVER TRUE

## 2023-04-14 SDOH — ECONOMIC STABILITY: HOUSING INSECURITY
IN THE LAST 12 MONTHS, WAS THERE A TIME WHEN YOU DID NOT HAVE A STEADY PLACE TO SLEEP OR SLEPT IN A SHELTER (INCLUDING NOW)?: NO

## 2023-04-14 SDOH — ECONOMIC STABILITY: INCOME INSECURITY: HOW HARD IS IT FOR YOU TO PAY FOR THE VERY BASICS LIKE FOOD, HOUSING, MEDICAL CARE, AND HEATING?: NOT VERY HARD

## 2023-04-14 ASSESSMENT — ENCOUNTER SYMPTOMS
CHEST TIGHTNESS: 0
SHORTNESS OF BREATH: 0

## 2023-04-17 ASSESSMENT — PATIENT HEALTH QUESTIONNAIRE - PHQ9
7. TROUBLE CONCENTRATING ON THINGS, SUCH AS READING THE NEWSPAPER OR WATCHING TELEVISION: 0
4. FEELING TIRED OR HAVING LITTLE ENERGY: 1
2. FEELING DOWN, DEPRESSED OR HOPELESS: 0
SUM OF ALL RESPONSES TO PHQ QUESTIONS 1-9: 3
9. THOUGHTS THAT YOU WOULD BE BETTER OFF DEAD, OR OF HURTING YOURSELF: 0
6. FEELING BAD ABOUT YOURSELF - OR THAT YOU ARE A FAILURE OR HAVE LET YOURSELF OR YOUR FAMILY DOWN: 0
1. LITTLE INTEREST OR PLEASURE IN DOING THINGS: 0
10. IF YOU CHECKED OFF ANY PROBLEMS, HOW DIFFICULT HAVE THESE PROBLEMS MADE IT FOR YOU TO DO YOUR WORK, TAKE CARE OF THINGS AT HOME, OR GET ALONG WITH OTHER PEOPLE: 0
3. TROUBLE FALLING OR STAYING ASLEEP: 1
SUM OF ALL RESPONSES TO PHQ QUESTIONS 1-9: 3
5. POOR APPETITE OR OVEREATING: 1
SUM OF ALL RESPONSES TO PHQ9 QUESTIONS 1 & 2: 0
SUM OF ALL RESPONSES TO PHQ QUESTIONS 1-9: 3
SUM OF ALL RESPONSES TO PHQ QUESTIONS 1-9: 3
8. MOVING OR SPEAKING SO SLOWLY THAT OTHER PEOPLE COULD HAVE NOTICED. OR THE OPPOSITE, BEING SO FIGETY OR RESTLESS THAT YOU HAVE BEEN MOVING AROUND A LOT MORE THAN USUAL: 0

## 2023-04-17 ASSESSMENT — ANXIETY QUESTIONNAIRES
1. FEELING NERVOUS, ANXIOUS, OR ON EDGE: 1
4. TROUBLE RELAXING: 1
2. NOT BEING ABLE TO STOP OR CONTROL WORRYING: 0
IF YOU CHECKED OFF ANY PROBLEMS ON THIS QUESTIONNAIRE, HOW DIFFICULT HAVE THESE PROBLEMS MADE IT FOR YOU TO DO YOUR WORK, TAKE CARE OF THINGS AT HOME, OR GET ALONG WITH OTHER PEOPLE: SOMEWHAT DIFFICULT
6. BECOMING EASILY ANNOYED OR IRRITABLE: 2
3. WORRYING TOO MUCH ABOUT DIFFERENT THINGS: 1
7. FEELING AFRAID AS IF SOMETHING AWFUL MIGHT HAPPEN: 0
5. BEING SO RESTLESS THAT IT IS HARD TO SIT STILL: 1
GAD7 TOTAL SCORE: 6

## 2023-05-08 RX ORDER — BUSPIRONE HYDROCHLORIDE 10 MG/1
TABLET ORAL
Qty: 270 TABLET | Refills: 1 | Status: SHIPPED | OUTPATIENT
Start: 2023-05-08

## 2023-05-08 NOTE — TELEPHONE ENCOUNTER
Last Visit: 04- OV   Next Appointment: 08-  Previous Refill Encounter: 04- #270 tabs with 1 refills    Requested Prescriptions     Pending Prescriptions Disp Refills    busPIRone (BUSPAR) 10 MG tablet [Pharmacy Med Name: BUSPIRONE HCL 10 MG TABLET] 270 tablet 1     Sig: take 1 tablet by mouth three times a day

## 2023-05-23 ENCOUNTER — PATIENT MESSAGE (OUTPATIENT)
Age: 28
End: 2023-05-23

## 2023-05-23 DIAGNOSIS — M26.622 ARTHRALGIA OF LEFT TEMPOROMANDIBULAR JOINT: Primary | ICD-10-CM

## 2023-05-24 RX ORDER — HYDROCODONE BITARTRATE AND ACETAMINOPHEN 7.5; 325 MG/1; MG/1
1 TABLET ORAL EVERY 6 HOURS PRN
Qty: 12 TABLET | Refills: 0 | Status: SHIPPED | OUTPATIENT
Start: 2023-05-24 | End: 2023-05-27

## 2023-06-05 NOTE — TELEPHONE ENCOUNTER
Last Visit: 04- OV   Next Appointment: 08-  Previous Refill Encounter: 01- #30 tabs with 2 refills    Requested Prescriptions     Pending Prescriptions Disp Refills    meloxicam (MOBIC) 15 MG tablet [Pharmacy Med Name: MELOXICAM 15 MG TABLET] 30 tablet      Sig: take 1 tablet by mouth once daily with food AS NEEDED FOR PAIN

## 2023-06-07 RX ORDER — MELOXICAM 15 MG/1
TABLET ORAL
Qty: 90 TABLET | Refills: 3 | Status: SHIPPED | OUTPATIENT
Start: 2023-06-07

## 2023-08-18 ENCOUNTER — OFFICE VISIT (OUTPATIENT)
Age: 28
End: 2023-08-18

## 2023-08-18 VITALS
BODY MASS INDEX: 25.76 KG/M2 | HEART RATE: 82 BPM | HEIGHT: 59 IN | RESPIRATION RATE: 18 BRPM | TEMPERATURE: 97.9 F | SYSTOLIC BLOOD PRESSURE: 110 MMHG | WEIGHT: 127.8 LBS | OXYGEN SATURATION: 95 % | DIASTOLIC BLOOD PRESSURE: 82 MMHG

## 2023-08-18 DIAGNOSIS — Z13.6 SCREENING FOR CARDIOVASCULAR, RESPIRATORY, AND GENITOURINARY DISEASES: ICD-10-CM

## 2023-08-18 DIAGNOSIS — Z00.00 ENCOUNTER FOR WELL ADULT EXAM WITHOUT ABNORMAL FINDINGS: Primary | ICD-10-CM

## 2023-08-18 DIAGNOSIS — Z13.83 SCREENING FOR CARDIOVASCULAR, RESPIRATORY, AND GENITOURINARY DISEASES: ICD-10-CM

## 2023-08-18 DIAGNOSIS — Z13.89 SCREENING FOR CARDIOVASCULAR, RESPIRATORY, AND GENITOURINARY DISEASES: ICD-10-CM

## 2023-08-18 DIAGNOSIS — M26.622 ARTHRALGIA OF LEFT TEMPOROMANDIBULAR JOINT: ICD-10-CM

## 2023-08-18 RX ORDER — HYDROCODONE BITARTRATE AND IBUPROFEN 7.5; 2 MG/1; MG/1
1 TABLET, FILM COATED ORAL EVERY 6 HOURS PRN
Qty: 28 TABLET | Refills: 0 | Status: SHIPPED | OUTPATIENT
Start: 2023-08-18 | End: 2023-08-25

## 2023-08-18 RX ORDER — METAXALONE 400 MG/1
400-800 TABLET ORAL 3 TIMES DAILY PRN
Qty: 45 TABLET | Refills: 3 | Status: SHIPPED | OUTPATIENT
Start: 2023-08-18

## 2023-08-18 ASSESSMENT — PATIENT HEALTH QUESTIONNAIRE - PHQ9
SUM OF ALL RESPONSES TO PHQ QUESTIONS 1-9: 5
9. THOUGHTS THAT YOU WOULD BE BETTER OFF DEAD, OR OF HURTING YOURSELF: 0
SUM OF ALL RESPONSES TO PHQ QUESTIONS 1-9: 5
SUM OF ALL RESPONSES TO PHQ QUESTIONS 1-9: 5
2. FEELING DOWN, DEPRESSED OR HOPELESS: 1
6. FEELING BAD ABOUT YOURSELF - OR THAT YOU ARE A FAILURE OR HAVE LET YOURSELF OR YOUR FAMILY DOWN: 0
5. POOR APPETITE OR OVEREATING: 1
4. FEELING TIRED OR HAVING LITTLE ENERGY: 1
7. TROUBLE CONCENTRATING ON THINGS, SUCH AS READING THE NEWSPAPER OR WATCHING TELEVISION: 0
SUM OF ALL RESPONSES TO PHQ9 QUESTIONS 1 & 2: 2
3. TROUBLE FALLING OR STAYING ASLEEP: 1
10. IF YOU CHECKED OFF ANY PROBLEMS, HOW DIFFICULT HAVE THESE PROBLEMS MADE IT FOR YOU TO DO YOUR WORK, TAKE CARE OF THINGS AT HOME, OR GET ALONG WITH OTHER PEOPLE: 1
1. LITTLE INTEREST OR PLEASURE IN DOING THINGS: 1
SUM OF ALL RESPONSES TO PHQ QUESTIONS 1-9: 5
8. MOVING OR SPEAKING SO SLOWLY THAT OTHER PEOPLE COULD HAVE NOTICED. OR THE OPPOSITE, BEING SO FIGETY OR RESTLESS THAT YOU HAVE BEEN MOVING AROUND A LOT MORE THAN USUAL: 0

## 2023-08-18 ASSESSMENT — ANXIETY QUESTIONNAIRES
3. WORRYING TOO MUCH ABOUT DIFFERENT THINGS: 1
4. TROUBLE RELAXING: 1
6. BECOMING EASILY ANNOYED OR IRRITABLE: 2
7. FEELING AFRAID AS IF SOMETHING AWFUL MIGHT HAPPEN: 0
IF YOU CHECKED OFF ANY PROBLEMS ON THIS QUESTIONNAIRE, HOW DIFFICULT HAVE THESE PROBLEMS MADE IT FOR YOU TO DO YOUR WORK, TAKE CARE OF THINGS AT HOME, OR GET ALONG WITH OTHER PEOPLE: NOT DIFFICULT AT ALL
1. FEELING NERVOUS, ANXIOUS, OR ON EDGE: 0
5. BEING SO RESTLESS THAT IT IS HARD TO SIT STILL: 0
GAD7 TOTAL SCORE: 5
2. NOT BEING ABLE TO STOP OR CONTROL WORRYING: 1

## 2023-08-18 ASSESSMENT — ENCOUNTER SYMPTOMS
CHEST TIGHTNESS: 0
SHORTNESS OF BREATH: 0

## 2023-08-18 NOTE — PROGRESS NOTES
1. \"Have you been to the ER, urgent care clinic since your last visit? Hospitalized since your last visit? \" No    2. \"Have you seen or consulted any other health care providers outside of the [address] [place] [address] since your last visit? \" No     3. For patients aged 43-73: Has the patient had a colonoscopy / FIT/ Cologuard? NA - based on age      If the patient is female:    4. For patients aged 43-66: Has the patient had a mammogram within the past 2 years? NA - based on age or sex      11. For patients aged 21-65: Has the patient had a pap smear?  No

## 2023-08-21 ENCOUNTER — TELEPHONE (OUTPATIENT)
Age: 28
End: 2023-08-21

## 2023-08-21 NOTE — TELEPHONE ENCOUNTER
Last appointment: 08/18/2023    Next appointment: 09/15/2023    Pharmacy requesting refill for     HYDROcodone-ibuprofen [facility] CLEARFORK) 7.5-200 MG per tablet      Pharmacy   [address] [place] [address] [address] [place] [address] [phone] [address] [facility],[address] [address]   Phone:  [phone]  Fax:  [phone]

## 2023-08-29 RX ORDER — MELOXICAM 15 MG/1
TABLET ORAL
Qty: 90 TABLET | Refills: 3 | OUTPATIENT
Start: 2023-08-29

## 2023-09-14 ENCOUNTER — PATIENT MESSAGE (OUTPATIENT)
Age: 28
End: 2023-09-14

## 2023-09-14 DIAGNOSIS — M26.622 ARTHRALGIA OF LEFT TEMPOROMANDIBULAR JOINT: ICD-10-CM

## 2023-09-15 NOTE — TELEPHONE ENCOUNTER
From: Earline Mccormick  To: Chao Nogueira  Sent: 9/14/2023 10:50 AM EDT  Subject: Medication     Hello,    I have still not received my pain meds, unfortunately I don't remember the exact name, it is going on four weeks since the prescription had been sent. I'm confused because [facility] keeps telling me different things but now they are saying they are waiting on the office to call back and that they are waiting on a refill for my old medication, but the provider had prescribed me something different . Could somebody call me possibly at [phone] for clarification ?

## 2023-09-15 NOTE — TELEPHONE ENCOUNTER
No Prescription for Metaxalone and Hydrocodone-Ibuprofen does not require a prior authorization. Outcome  Additional Information Required  Member should be able to get the drug/product without a PA at this time. Drug  Metaxalone 400MG tablets  Form  [employer] ePA Form 2017 NCPDP      Outcome  Additional Information Required  Member should be able to get the drug/product without a PA at this time.   Drug  HYDROcodone-Ibuprofen 7.5-200MG tablets  Form  [employer] ePA Form 2017 NCPDP No

## 2023-09-19 NOTE — TELEPHONE ENCOUNTER
Pharmacy Change     Last Appointment- 8/18/23    Next Appointment- 9/25/23    Previous Refill Encounter(s)Metaxalone: Date: 8/18/23 #45 Refills 3    Per dispense report patient was dispensed a 8 day supply 45 tablets of Metaxalone on 8/21/23    Previous Refill Encounter(s) Hydrocodone-Ibuprofen : Date: 8/18/23 #28 Refills 0    Per dispense report patient was dispensed a 7 day supply 28 tablets of Hydrocodone-Ibuprofen on 8/21/23.

## 2023-09-22 RX ORDER — HYDROCODONE BITARTRATE AND IBUPROFEN 7.5; 2 MG/1; MG/1
1 TABLET, FILM COATED ORAL EVERY 6 HOURS PRN
Qty: 28 TABLET | Refills: 0 | Status: SHIPPED | OUTPATIENT
Start: 2023-09-22 | End: 2023-09-29

## 2023-09-22 RX ORDER — METAXALONE 400 MG/1
400-800 TABLET ORAL 3 TIMES DAILY PRN
Qty: 45 TABLET | Refills: 3 | Status: SHIPPED | OUTPATIENT
Start: 2023-09-22

## 2023-09-25 ENCOUNTER — TELEMEDICINE (OUTPATIENT)
Age: 28
End: 2023-09-25
Payer: COMMERCIAL

## 2023-09-25 DIAGNOSIS — M26.622 ARTHRALGIA OF LEFT TEMPOROMANDIBULAR JOINT: Primary | ICD-10-CM

## 2023-09-25 PROCEDURE — 99213 OFFICE O/P EST LOW 20 MIN: CPT | Performed by: NURSE PRACTITIONER

## 2023-09-25 NOTE — PROGRESS NOTES
Allergies    Review of Systems    Objective: There were no vitals taken for this visit. General: alert, cooperative, no distress   Mental  status: normal mood, behavior, speech, dress, motor activity, and thought processes, able to follow commands   HENT: NCAT   Neck: no visualized mass   Resp: no respiratory distress   Neuro: no gross deficits   Skin: no discoloration or lesions of concern on visible areas   Psychiatric: normal affect, consistent with stated mood, no evidence of hallucinations     Additional exam findings: We discussed the expected course, resolution and complications of the diagnosis(es) in detail. Medication risks, benefits, costs, interactions, and alternatives were discussed as indicated. I advised her to contact the office if her condition worsens, changes or fails to improve as anticipated. She expressed understanding with the diagnosis(es) and plan. Consent: Pepito Nina, who was seen by synchronous (real-time) audio-video technology, and/or her healthcare decision maker, is aware that this patient-initiated, Telehealth encounter on 9/25/2023 is a billable service, with coverage as determined by her insurance carrier. She is aware that she may receive a bill and has provided verbal consent to proceed: yes    Pepito Nina, was evaluated through a synchronous (real-time) audio-video encounter. The patient (or guardian if applicable) is aware that this is a billable service, which includes applicable co-pays. This Virtual Visit was conducted with patient's (and/or legal guardian's) consent. The visit was conducted pursuant to the emergency declaration under the [address] and the [employer] and [employer] Act. Patient identification was verified, and a caregiver was present when appropriate.    The patient was located at Home: [address] [place] [address]  Provider was

## 2023-11-14 DIAGNOSIS — M26.622 ARTHRALGIA OF LEFT TEMPOROMANDIBULAR JOINT: ICD-10-CM

## 2023-11-14 NOTE — TELEPHONE ENCOUNTER
Last Appointment- 9/25/23    Next Appointment- 3/25/24    Previous Refill Encounter(s): Date: 8/18/23 #28 Refills 0    Requested Prescriptions     Pending Prescriptions Disp Refills    HYDROcodone-ibuprofen (VICOPROFEN) 7.5-200 MG per tablet 28 tablet 0     Sig: Take 1 tablet by mouth every 6 hours as needed for Pain (take with food) for up to 7 days.  Max Daily Amount: 4 tablets

## 2023-11-17 RX ORDER — HYDROCODONE BITARTRATE AND IBUPROFEN 7.5; 2 MG/1; MG/1
1 TABLET, FILM COATED ORAL EVERY 6 HOURS PRN
Qty: 28 TABLET | Refills: 0 | Status: SHIPPED | OUTPATIENT
Start: 2023-11-17 | End: 2023-11-24

## 2024-01-10 NOTE — TELEPHONE ENCOUNTER
Last Visit: 09- VV   Next Appointment: 03-  Previous Refill Encounter: 05- #270 tabs with 1 refills      Requested Prescriptions     Pending Prescriptions Disp Refills    busPIRone (BUSPAR) 10 MG tablet 270 tablet 1     Sig: Take 1 tablet by mouth 3 times daily

## 2024-01-10 NOTE — TELEPHONE ENCOUNTER
Last appointment: 09/25/2023    Next appointment: 03/25/2024    Pharmacy requesting refill for     metaxalone (SKELAXIN) 400 MG tablet [[id number]]    Pharmacy   [id number] [place] [address] - P [phone] - F [phone] (Pharmacy)

## 2024-01-10 NOTE — TELEPHONE ENCOUNTER
Patient called to request refill for busPIRone (BUSPAR) 10 MG tablet  be sent to her current pharmacy [facility] [address] [facility]

## 2024-01-15 ENCOUNTER — TELEPHONE (OUTPATIENT)
Age: 29
End: 2024-01-15

## 2024-01-15 NOTE — TELEPHONE ENCOUNTER
Duplicate Request-    Per patient's pharmacy patient needs a new prescription. Refill request was submitted to RAMANA Adam on 1/10/24.

## 2024-01-15 NOTE — TELEPHONE ENCOUNTER
Patient called stating her pharmacy denied her prescription for  metaxalone (SKELAXIN) 400 MG tablet [[id number]     She was wondering if she is supposed to stop taking it.

## 2024-01-15 NOTE — TELEPHONE ENCOUNTER
Duplicate request. Patient called office to request Buspar on 1/10/24 and is pending RAMANA Adam signature.

## 2024-01-15 NOTE — TELEPHONE ENCOUNTER
Last Visit:9/25/2023  Next Visit:3/25/2024        Prescription Request  busPIRone (BUSPAR) 10 MG tablet [[id number]]     [facility] #[id number] - [place], [place] - [address] - P [phone] - F [phone] [address] [address]  Phone: [phone]  Fax: [phone]

## 2024-01-16 RX ORDER — METAXALONE 400 MG/1
400-800 TABLET ORAL 3 TIMES DAILY PRN
Qty: 45 TABLET | Refills: 3 | Status: SHIPPED | OUTPATIENT
Start: 2024-01-16

## 2024-01-16 RX ORDER — BUSPIRONE HYDROCHLORIDE 10 MG/1
10 TABLET ORAL 3 TIMES DAILY
Qty: 270 TABLET | Refills: 1 | Status: SHIPPED | OUTPATIENT
Start: 2024-01-16 | End: 2024-01-16 | Stop reason: SDUPTHER

## 2024-01-16 RX ORDER — BUSPIRONE HYDROCHLORIDE 10 MG/1
10 TABLET ORAL 3 TIMES DAILY
Qty: 270 TABLET | Refills: 3 | Status: SHIPPED | OUTPATIENT
Start: 2024-01-16

## 2024-01-31 ENCOUNTER — PATIENT MESSAGE (OUTPATIENT)
Age: 29
End: 2024-01-31

## 2024-01-31 NOTE — TELEPHONE ENCOUNTER
From: Barbra Williamson  To: Kathie Adam  Sent: 1/31/2024 1:45 PM EST  Subject: Meloxicam    Hello, just requesting if my prescription of meloxicam can be sent to [facility]. Thank you!

## 2024-01-31 NOTE — TELEPHONE ENCOUNTER
Pharmacy Change    Last Appointment- 9/25/23    Next Appointment- 3/25/24    Previous Refill Encounter(s): Date: 6/7/23 #90 Refills 3    Requested Prescriptions     Pending Prescriptions Disp Refills    meloxicam (MOBIC) 15 MG tablet 90 tablet 3

## 2024-02-01 RX ORDER — MELOXICAM 15 MG/1
15 TABLET ORAL DAILY PRN
Qty: 90 TABLET | Refills: 3 | Status: SHIPPED | OUTPATIENT
Start: 2024-02-01

## 2024-03-01 ENCOUNTER — PATIENT MESSAGE (OUTPATIENT)
Age: 29
End: 2024-03-01

## 2024-03-01 DIAGNOSIS — M26.622 ARTHRALGIA OF LEFT TEMPOROMANDIBULAR JOINT: ICD-10-CM

## 2024-03-01 NOTE — TELEPHONE ENCOUNTER
[facility]17[facility]23 for a 7 d/s.      Last Visit: 9/25/23  Next Appointment: 3/25/24  Previous Refill Encounter(s): Date: 11/17/23 #28  Controlled Substance Agreement: 8/18/23  Urine Drug Screen: None    Requested Prescriptions     Pending Prescriptions Disp Refills    HYDROcodone-acetaminophen (NORCO) 7.5-325 MG per tablet 12 tablet 0     Sig: Take 1 tablet by mouth every 6 hours as needed for Pain for up to 3 days. Intended supply: 3 days. Take lowest dose possible to manage pain Max Daily Amount: 4 tablets

## 2024-03-01 NOTE — TELEPHONE ENCOUNTER
From: Barbra Williamson  To: Kathie Adam  Sent: 3/1/2024 1:21 PM EST  Subject: Hydrocodone/Ibuprofen     Hello,    Just wondering if I could get a refill on my meds for my jaw/facial pain please.     Thank you! Happy Friday!

## 2024-03-08 ENCOUNTER — PATIENT MESSAGE (OUTPATIENT)
Age: 29
End: 2024-03-08

## 2024-03-11 RX ORDER — HYDROCODONE BITARTRATE AND ACETAMINOPHEN 7.5; 325 MG/1; MG/1
1 TABLET ORAL EVERY 6 HOURS PRN
Qty: 12 TABLET | Refills: 0 | Status: SHIPPED | OUTPATIENT
Start: 2024-03-11 | End: 2024-03-14

## 2024-03-11 NOTE — TELEPHONE ENCOUNTER
From: Barbra Williamson  To: Kathie Adam  Sent: 3/8/2024 3:02 PM EST  Subject: Meloxicam     Hello! Can I have my prescription of meloxicam sent to the [facility] delivery pharmacy, and have that changed to my primary pharmacy on file.  Thank you !

## 2024-03-11 NOTE — TELEPHONE ENCOUNTER
Pharmacy Change    Last Appointment- 9/25/23    Next Appointment- 3/25/23    Previous Refill Encounter(s): Date: 2/1/24 #90 Refills 3    Requested Prescriptions     Pending Prescriptions Disp Refills    meloxicam (MOBIC) 15 MG tablet 90 tablet 3     Sig: Take 1 tablet by mouth daily as needed for Pain (as needed for pain.  take with food)

## 2024-03-21 RX ORDER — MELOXICAM 15 MG/1
15 TABLET ORAL DAILY PRN
Qty: 90 TABLET | Refills: 3 | Status: SHIPPED | OUTPATIENT
Start: 2024-03-21

## 2024-03-25 ENCOUNTER — TELEMEDICINE (OUTPATIENT)
Age: 29
End: 2024-03-25
Payer: COMMERCIAL

## 2024-03-25 DIAGNOSIS — Z11.59 NEED FOR HEPATITIS C SCREENING TEST: ICD-10-CM

## 2024-03-25 DIAGNOSIS — Z13.89 SCREENING FOR CARDIOVASCULAR, RESPIRATORY, AND GENITOURINARY DISEASES: ICD-10-CM

## 2024-03-25 DIAGNOSIS — Z13.83 SCREENING FOR CARDIOVASCULAR, RESPIRATORY, AND GENITOURINARY DISEASES: ICD-10-CM

## 2024-03-25 DIAGNOSIS — F32.0 MAJOR DEPRESSIVE DISORDER, SINGLE EPISODE, MILD (HCC): ICD-10-CM

## 2024-03-25 DIAGNOSIS — Z13.6 SCREENING FOR CARDIOVASCULAR, RESPIRATORY, AND GENITOURINARY DISEASES: ICD-10-CM

## 2024-03-25 DIAGNOSIS — M26.622 ARTHRALGIA OF LEFT TEMPOROMANDIBULAR JOINT: Primary | ICD-10-CM

## 2024-03-25 PROCEDURE — 99214 OFFICE O/P EST MOD 30 MIN: CPT | Performed by: NURSE PRACTITIONER

## 2024-03-25 RX ORDER — OMEPRAZOLE 20 MG/1
20 CAPSULE, DELAYED RELEASE ORAL
Qty: 90 CAPSULE | Refills: 4 | Status: SHIPPED | OUTPATIENT
Start: 2024-03-25

## 2024-03-25 RX ORDER — MELOXICAM 15 MG/1
15 TABLET ORAL DAILY PRN
Qty: 90 TABLET | Refills: 3 | Status: SHIPPED | OUTPATIENT
Start: 2024-03-25

## 2024-03-25 RX ORDER — METAXALONE 800 MG/1
800 TABLET ORAL 3 TIMES DAILY PRN
Qty: 90 TABLET | Refills: 5 | Status: SHIPPED | OUTPATIENT
Start: 2024-03-25

## 2024-03-25 ASSESSMENT — PATIENT HEALTH QUESTIONNAIRE - PHQ9: DEPRESSION UNABLE TO ASSESS: PT REFUSES

## 2024-03-25 NOTE — PROGRESS NOTES
Barbra Williamson is a 28 y.o. (1995) female is a Established patient, who was seen by synchronous (real-time) audio-video technology on 3/25/2024 for evaluation of the following:   Chief Complaint   Patient presents with    Anxiety    Temporomandibular Joint Pain     Assessment & Plan:   Barbra was seen today for anxiety and temporomandibular joint pain.    Diagnoses and all orders for this visit:    Arthralgia of left temporomandibular joint    Major depressive disorder, single episode, mild (HCC)    Other orders  -     metaxalone (SKELAXIN) 800 MG tablet; Take 1 tablet by mouth 3 times daily as needed (muscle spasms)  -     meloxicam (MOBIC) 15 MG tablet; Take 1 tablet by mouth daily as needed for Pain (as needed for pain.  take with food)  -     omeprazole (PRILOSEC) 20 MG delayed release capsule; Take 1 capsule by mouth every morning (before breakfast)  -     diclofenac sodium (VOLTAREN) 1 % GEL; Apply 2 g topically 4 times daily as needed for Pain      current treatment plan is effective, no change in therapy, orders and follow up as documented in [facility]    I have discussed the diagnosis with the patient and the intended plan as seen in the above orders.  The patient has received an after-visit summary via [employer] and questions were answered concerning future plans.  I have discussed medication side effects and warnings with the patient as well. Patient agreeable with above plan and verbalizes understanding.    Return in about 21 weeks (around 8/19/2024) for Well woman, fasting labs 1 week prior, in office ONLY.    Subjective:   Patient states symptoms are stable with current regimen of mobic and skelaxin.  States she has been taking 800 mg of skelaxin which has also helped with spasms.  Reports she has been out of mobic for approx 2 weeks, rx needs to be sent to mail order pharmacy.  Has been taking otc ibuprofen with some relief.  Also has been taking 1/2 norco if pain is severe which has been recently d/t

## 2024-03-25 NOTE — ASSESSMENT & PLAN NOTE
Stable, based on history, physical exam, and review of pertinent labs, studies and medications; medications reconciled; continue current treatment plan.

## 2024-03-25 NOTE — PROGRESS NOTES
Barbra Williamson, was evaluated through a synchronous (real-time) audio-video encounter. The patient (or guardian if applicable) is aware that this is a billable service, which includes applicable co-pays. This Virtual Visit was conducted with patient's (and/or legal guardian's) consent. Patient identification was verified, and a caregiver was present when appropriate.   The patient was located at Home: [address] [place] [address]  Provider was located at Facility (Appt Dept): [address] [facility] [address]  [place] [address]      Barbra Williamson (:  1995) is a Established patient, presenting virtually for evaluation of the following:        --Cindy Hunter          English

## 2024-08-12 ENCOUNTER — HOSPITAL ENCOUNTER (OUTPATIENT)
Facility: HOSPITAL | Age: 29
Discharge: HOME | End: 2024-08-15

## 2024-08-12 LAB — LABCORP SPECIMEN COLLECTION: NORMAL

## 2024-08-12 PROCEDURE — 99001 SPECIMEN HANDLING PT-LAB: CPT

## 2024-08-13 LAB
ALBUMIN SERPL-MCNC: 4.5 G/DL (ref 4–5)
ALP SERPL-CCNC: 49 IU/L (ref 44–121)
ALT SERPL-CCNC: 8 IU/L (ref 0–32)
AST SERPL-CCNC: 16 IU/L (ref 0–40)
BILIRUB SERPL-MCNC: 0.2 MG/DL (ref 0–1.2)
BUN SERPL-MCNC: 10 MG/DL (ref 6–20)
BUN/CREAT SERPL: 12 (ref 9–23)
CALCIUM SERPL-MCNC: 9.2 MG/DL (ref 8.7–10.2)
CHLORIDE SERPL-SCNC: 104 MMOL/L (ref 96–106)
CHOLEST SERPL-MCNC: 173 MG/DL (ref 100–199)
CO2 SERPL-SCNC: 21 MMOL/L (ref 20–29)
CREAT SERPL-MCNC: 0.82 MG/DL (ref 0.57–1)
EGFRCR SERPLBLD CKD-EPI 2021: 100 ML/MIN/1.73
GLOBULIN SER CALC-MCNC: 2 G/DL (ref 1.5–4.5)
GLUCOSE SERPL-MCNC: 80 MG/DL (ref 70–99)
HBA1C MFR BLD: 5.2 % (ref 4.8–5.6)
HCV IGG SERPL QL IA: NON REACTIVE
HDLC SERPL-MCNC: 73 MG/DL
LDLC SERPL CALC-MCNC: 83 MG/DL (ref 0–99)
POTASSIUM SERPL-SCNC: 4.6 MMOL/L (ref 3.5–5.2)
PROT SERPL-MCNC: 6.5 G/DL (ref 6–8.5)
SODIUM SERPL-SCNC: 141 MMOL/L (ref 134–144)
SPECIMEN STATUS REPORT: NORMAL
TRIGL SERPL-MCNC: 92 MG/DL (ref 0–149)
VLDLC SERPL CALC-MCNC: 17 MG/DL (ref 5–40)

## 2024-08-22 ENCOUNTER — OFFICE VISIT (OUTPATIENT)
Facility: CLINIC | Age: 29
End: 2024-08-22
Payer: COMMERCIAL

## 2024-08-22 VITALS
DIASTOLIC BLOOD PRESSURE: 82 MMHG | BODY MASS INDEX: 25.96 KG/M2 | TEMPERATURE: 99.2 F | RESPIRATION RATE: 20 BRPM | SYSTOLIC BLOOD PRESSURE: 113 MMHG | HEIGHT: 59 IN | OXYGEN SATURATION: 96 % | HEART RATE: 96 BPM | WEIGHT: 128.8 LBS

## 2024-08-22 DIAGNOSIS — Z87.42 HISTORY OF OVARIAN CYST: ICD-10-CM

## 2024-08-22 DIAGNOSIS — Z00.00 ENCOUNTER FOR WELL ADULT EXAM WITHOUT ABNORMAL FINDINGS: Primary | ICD-10-CM

## 2024-08-22 DIAGNOSIS — M26.622 ARTHRALGIA OF LEFT TEMPOROMANDIBULAR JOINT: ICD-10-CM

## 2024-08-22 PROCEDURE — 99395 PREV VISIT EST AGE 18-39: CPT | Performed by: NURSE PRACTITIONER

## 2024-08-22 RX ORDER — HYDROCODONE BITARTRATE AND IBUPROFEN 7.5; 2 MG/1; MG/1
1 TABLET, FILM COATED ORAL EVERY 6 HOURS PRN
Qty: 28 TABLET | Refills: 0 | Status: SHIPPED | OUTPATIENT
Start: 2024-08-22 | End: 2024-08-29

## 2024-08-22 NOTE — PROGRESS NOTES
Well Adult Note  Name: Barbra Williamson Today’s Date: 2024   MRN: [id number] Sex: Female   Age: 29 y.o. Ethnicity: Non- / Non    : 1995 Race: White (non-)      Barbra Williamson is here for a well adult exam.          Assessment & Plan  1. Anxiety.  She has been taking BuSpar 20 mg twice daily and occasionally a third dose at night. She reports no adverse side effects but is unsure if it has improved her anxiety. She is advised to continue taking BuSpar 20 mg twice daily and to try to be more consistent with her dosing schedule. If there is no improvement, other medications will be considered.    2. Postherpetic neuralgia.  She continues to experience tingling sensations in her abdomen where she previously had shingles. Over-the-counter lidocaine patches and capsaicin cream were recommended to help manage these symptoms.    3. Ovarian cyst.  She has a history of an ovarian cyst discovered in  and reports a recent episode of severe pain in July that resolved the next day. A pelvic ultrasound was ordered to evaluate the current status of the cyst.    4. Medication Management.  A refill for meloxicam was requested and will [facility].    Follow-up  A virtual follow-up appointment is scheduled for 6 weeks from now.    orders and [facility], lab results reviewed with patient    I have discussed the diagnosis with the patient and the intended plan as seen in the above orders.  The patient has received an after-visit summary and questions were answered concerning future plans.  I have discussed medication side effects and warnings with the patient as well. Patient agreeable with above plan and verbalizes understanding.    Return in about 6 weeks (around 10/3/2024) for anxiety, since med/dose adjust/change.       Subjective   History of Present Illness  The patient is a 29-year-old female who presents for an annual physical

## 2024-08-22 NOTE — PROGRESS NOTES
\"Have you been to the ER, urgent care clinic since your last visit?  Hospitalized since your last visit?\"    NO    “Have you seen or consulted any other health care providers outside of [facility] since your last visit?”    NO     “Have you had a pap smear?”    NO    No cervical cancer screening on file             Click Here for Release of Records Request

## 2024-09-27 ENCOUNTER — HOSPITAL ENCOUNTER (OUTPATIENT)
Facility: HOSPITAL | Age: 29
Discharge: HOME | End: 2024-09-30
Payer: COMMERCIAL

## 2024-09-27 DIAGNOSIS — Z87.42 HISTORY OF OVARIAN CYST: ICD-10-CM

## 2024-09-27 PROCEDURE — 93975 VASCULAR STUDY: CPT

## 2024-09-27 PROCEDURE — 93976 VASCULAR STUDY: CPT

## 2024-10-15 ENCOUNTER — TELEMEDICINE (OUTPATIENT)
Facility: CLINIC | Age: 29
End: 2024-10-15
Payer: COMMERCIAL

## 2024-10-15 DIAGNOSIS — F41.9 ANXIETY AND DEPRESSION: Primary | ICD-10-CM

## 2024-10-15 DIAGNOSIS — F32.A ANXIETY AND DEPRESSION: Primary | ICD-10-CM

## 2024-10-15 PROCEDURE — 99213 OFFICE O/P EST LOW 20 MIN: CPT | Performed by: NURSE PRACTITIONER

## 2024-10-15 NOTE — PROGRESS NOTES
Barbra Williamson is a 29 y.o. (1995) female is a Established patient, presents alone, who was seen by synchronous (real-time) audio-video technology on 10/15/2024 for evaluation of the following:   Chief Complaint   Patient presents with    Anxiety   History obtained from patient.    Assessment & Plan:     Assessment & Plan  1. Anxiety.  She reports no significant improvement in her anxiety symptoms since the increase in BuSpar. She experiences mild tiredness but no other negative effects. She is considering trying a different medication but is not ready to start yet. Lexapro was discussed as an alternative, starting at 5 mg for a couple of weeks and then increasing to 10 mg if needed. She can continue taking BuSpar either as needed or twice a day, and she has been able to take it three times a day most of the time. Information about Lexapro, including potential side effects such as drowsiness and headaches, will be provided for her review. If she decides to proceed with Lexapro, she will inform the provider, and the treatment will be initiated with a follow-up scheduled thereafter.      Anxiety and depression    Return if symptoms worsen or fail to improve.    Subjective:     History of Present Illness  The patient is a 29-year-old female who presents via virtual visit for 6-week follow-up for anxiety.    She reports that the increased dosage of BuSpar has not significantly improved her anxiety symptoms, although it does occasionally induce fatigue. She is considering a change in medication due to the lack of noticeable benefits from her current regimen. She has been adhering to the prescribed three doses of BuSpar daily, with the third dose typically taken in the evening or at night.    Prior to Admission medications    Medication Sig Start Date End Date Taking? Authorizing Provider   metaxalone (SKELAXIN) 800 MG tablet Take 1 tablet by mouth 3 times daily as needed (muscle spasms) 3/25/24   Kathie Adam, FRANCISCA

## 2024-11-11 ENCOUNTER — PATIENT MESSAGE (OUTPATIENT)
Facility: CLINIC | Age: 29
End: 2024-11-11

## 2024-11-11 DIAGNOSIS — M26.622 ARTHRALGIA OF LEFT TEMPOROMANDIBULAR JOINT: ICD-10-CM

## 2024-11-11 NOTE — TELEPHONE ENCOUNTER
[facility]24 for a 7 d/s.     Last Appointment- None    Next Appointment- 10/15/24    Previous Refill Encounter(s): Date: 8/22/24 #28 Refills 0     Controlled Substance Agreement: None  Urine Drug Screen: None      Requested Prescriptions     Pending Prescriptions Disp Refills    HYDROcodone-ibuprofen (VICOPROFEN) 7.5-200 MG per tablet 28 tablet 0     Sig: Take 1 tablet by mouth every 6 hours as needed for Pain (take with food) for up to 7 days. Max Daily Amount: 4 tablets

## 2024-11-14 NOTE — TELEPHONE ENCOUNTER
Patient called to check on request for Refill     Last Appointment: 10/15/2024    Next Appointment: none    Medication:    HYDROcodone-ibuprofen (VICOPROFEN) 7.5-200 MG per tablet       Pharmacy:   [facility]   [address] [place] [id number] [phone]

## 2024-11-22 RX ORDER — HYDROCODONE BITARTRATE AND IBUPROFEN 7.5; 2 MG/1; MG/1
1 TABLET, FILM COATED ORAL EVERY 6 HOURS PRN
Qty: 28 TABLET | Refills: 0 | Status: SHIPPED | OUTPATIENT
Start: 2024-11-22 | End: 2024-11-29

## 2024-11-26 ENCOUNTER — TELEPHONE (OUTPATIENT)
Facility: CLINIC | Age: 29
End: 2024-11-26

## 2024-12-05 NOTE — TELEPHONE ENCOUNTER
Last appointment: 10/15/2024    Next appointment: none    Pharmacy requesting refill for     busPIRone (BUSPAR) 10 MG tablet [[id number]]    Pharmacy   [facility] - [address], [address] - P [phone] - F [phone] (Pharmacy)

## 2024-12-18 ENCOUNTER — OFFICE VISIT (OUTPATIENT)
Facility: CLINIC | Age: 29
End: 2024-12-18
Payer: COMMERCIAL

## 2024-12-18 VITALS
RESPIRATION RATE: 18 BRPM | TEMPERATURE: 97.5 F | WEIGHT: 124.6 LBS | DIASTOLIC BLOOD PRESSURE: 73 MMHG | BODY MASS INDEX: 25.12 KG/M2 | SYSTOLIC BLOOD PRESSURE: 106 MMHG | OXYGEN SATURATION: 97 % | HEIGHT: 59 IN | HEART RATE: 85 BPM

## 2024-12-18 DIAGNOSIS — N89.8 VAGINAL LESION: Primary | ICD-10-CM

## 2024-12-18 PROCEDURE — 99213 OFFICE O/P EST LOW 20 MIN: CPT | Performed by: NURSE PRACTITIONER

## 2024-12-18 RX ORDER — BUSPIRONE HYDROCHLORIDE 10 MG/1
10 TABLET ORAL 3 TIMES DAILY
Qty: 270 TABLET | Refills: 3 | Status: SHIPPED | OUTPATIENT
Start: 2024-12-18

## 2024-12-18 NOTE — PROGRESS NOTES
Barbra Williamson (:  1995) is a 29 y.o. female, Established patient, presents alone, here for evaluation of the following chief complaint(s):  Mass (On right side of labia x's 1 week.Patient states its not painful and itchy but its bleeding. )  History obtained from patient.    Assessment & Plan  1. Lesion on the right labia.  The lesion does not exhibit characteristics consistent with herpes simplex virus (HSV) or folliculitis. It is not painful or itchy, but there is occasional bleeding, especially noted upon waking. There is no blood observed internally. She has been advised to maintain cleanliness of the affected area using a warm washcloth.  This patient advised to schedule an appointment with her gynecologist.    2. Medication management.  She is not interested in starting Lexapro at this time and wants to continue with her current medication regimen buspar.    Follow-up  The patient will follow up in 6 months via telemedicine for TMJ pain and anxiety and once a year in the office patient for routine physical    Results    Barbra was seen today for mass.    Diagnoses and all orders for this visit:    Vaginal lesion      orders and follow up as documented in [facility]    I have discussed the diagnosis with the patient and the intended plan as seen in the above orders.  The patient has received an after-visit summary and questions were answered concerning future plans.  I have discussed medication side effects and warnings with the patient as well. Patient agreeable with above plan and verbalizes understanding.    Return in about 4 months (around 2025) for TMJ/anxiety, telemedicine MyChart.    Subjective   History of Present Illness  The patient is a 29-year-old female who presents for evaluation of a bump on the right labia.    She reports the presence of a non-painful, non-itchy bump located on the lower right labia, near the vaginal entrance. She does not engage in shaving practices. Initially,

## 2024-12-19 ENCOUNTER — TELEPHONE (OUTPATIENT)
Facility: CLINIC | Age: 29
End: 2024-12-19

## 2024-12-19 NOTE — TELEPHONE ENCOUNTER
Left patient voicemail to contact office to schedule follow up appointment.       Return in about 4 months (around 4/18/2025) for TMJ/anxiety, telemedicine MyChart.

## 2024-12-23 ENCOUNTER — TELEPHONE (OUTPATIENT)
Facility: CLINIC | Age: 29
End: 2024-12-23

## 2024-12-23 RX ORDER — METAXALONE 800 MG/1
800 TABLET ORAL 3 TIMES DAILY PRN
Qty: 90 TABLET | Refills: 5 | Status: SHIPPED | OUTPATIENT
Start: 2024-12-23

## 2024-12-23 NOTE — TELEPHONE ENCOUNTER
Patient called to request Refill     Last Appointment: 12/18/2024    Next Appointment: none    Medication: metaxalone (SKELAXIN) 800 MG tablet [[id number]]     Pharmacy:   [facility] - [facility] [address] [place] [address], [facility] [address] - P [phone] - F [phone]  [address] [place] [address], [address] [address]  Phone: [phone]  Fax: [phone]

## 2025-02-18 DIAGNOSIS — M26.622 ARTHRALGIA OF LEFT TEMPOROMANDIBULAR JOINT: ICD-10-CM

## 2025-02-18 RX ORDER — HYDROCODONE BITARTRATE AND IBUPROFEN 7.5; 2 MG/1; MG/1
1 TABLET, FILM COATED ORAL EVERY 6 HOURS PRN
Qty: 28 TABLET | Refills: 0 | Status: SHIPPED | OUTPATIENT
Start: 2025-02-18 | End: 2025-02-25

## 2025-02-18 NOTE — TELEPHONE ENCOUNTER
[facility] Hydrocodone-Ibuprofen as 11/22/24 for a 7 d/s.      Last Visit: 12/18/24  Next Appointment: None  Previous Refill Encounter(s): Date: 11/22/24 #28  Controlled Substance Agreement: 12/18/25  Urine Drug Screen: None    Requested Prescriptions     Pending Prescriptions Disp Refills    HYDROcodone-ibuprofen (VICOPROFEN) 7.5-200 MG per tablet 28 tablet 0     Sig: Take 1 tablet by mouth every 6 hours as needed for Pain (take with food) for up to 7 days. Max Daily Amount: 4 tablets

## 2025-02-18 NOTE — TELEPHONE ENCOUNTER
Last ov: 12/18/2024  Next ov: none    Requested prescriptions    HYDROcodone-ibuprofen (VICOPROFEN) 7.5-200 MG per tablet [[id number]]  DISCONTINUED       Pharmacy    [facility] [place] [address]

## 2025-03-04 ENCOUNTER — TELEPHONE (OUTPATIENT)
Facility: CLINIC | Age: 30
End: 2025-03-04

## 2025-03-04 NOTE — TELEPHONE ENCOUNTER
----- Message from Christian CALLE sent at 2/14/2025  3:38 PM EST -----  Regarding: ECC Message to Provider  ECC Message to Provider    Relationship to Patient: Self     Additional Information : Patient is requesting when will she have a follow up appointment with the doctor. She was advised to be back for a follow up and the patient didn't know when the follow up appointment. This is for her routine follow-up and also request to refill her hydro.   --------------------------------------------------------------------------------------------------------------------------    Call Back Information: OK to leave message on voicemail  Preferred Call Back Number: [phone]

## 2025-03-04 NOTE — TELEPHONE ENCOUNTER
Voicemail left for patient to contact office to schedule follow up   Return in about 4 months (around 4/18/2025) for TMJ/anxiety, telemedicine MyChart.

## 2025-03-05 NOTE — TELEPHONE ENCOUNTER
Patient called to request medication refill:    Last visit date: 12/18/2024    Next visit date: 04/14/2025    meloxicam (MOBIC) 15 MG tablet [[id number]]     [facility] Delivery - [place], [place] - [address], [facility] [address] - P [phone] - F [phone]  [address], [place] [address], [facility] [address]  Phone: [phone]  Fax: [phone]

## 2025-03-19 RX ORDER — MELOXICAM 15 MG/1
15 TABLET ORAL DAILY PRN
Qty: 90 TABLET | Refills: 3 | Status: SHIPPED | OUTPATIENT
Start: 2025-03-19

## 2025-04-14 ENCOUNTER — TELEMEDICINE (OUTPATIENT)
Facility: CLINIC | Age: 30
End: 2025-04-14
Payer: COMMERCIAL

## 2025-04-14 DIAGNOSIS — Z13.89 SCREENING FOR CARDIOVASCULAR, RESPIRATORY, AND GENITOURINARY DISEASES: ICD-10-CM

## 2025-04-14 DIAGNOSIS — M26.622 ARTHRALGIA OF LEFT TEMPOROMANDIBULAR JOINT: ICD-10-CM

## 2025-04-14 DIAGNOSIS — Z13.83 SCREENING FOR CARDIOVASCULAR, RESPIRATORY, AND GENITOURINARY DISEASES: ICD-10-CM

## 2025-04-14 DIAGNOSIS — Z13.6 SCREENING FOR CARDIOVASCULAR, RESPIRATORY, AND GENITOURINARY DISEASES: ICD-10-CM

## 2025-04-14 DIAGNOSIS — F41.1 GENERALIZED ANXIETY DISORDER: Primary | ICD-10-CM

## 2025-04-14 PROCEDURE — 99214 OFFICE O/P EST MOD 30 MIN: CPT | Performed by: NURSE PRACTITIONER

## 2025-04-14 RX ORDER — BUSPIRONE HYDROCHLORIDE 10 MG/1
20 TABLET ORAL 3 TIMES DAILY
Qty: 540 TABLET | Refills: 3 | Status: SHIPPED | OUTPATIENT
Start: 2025-04-14

## 2025-04-14 RX ORDER — MELOXICAM 15 MG/1
15 TABLET ORAL DAILY
Qty: 90 TABLET | Refills: 3 | Status: SHIPPED | OUTPATIENT
Start: 2025-04-14

## 2025-04-14 RX ORDER — HYDROCODONE BITARTRATE AND IBUPROFEN 7.5; 2 MG/1; MG/1
1 TABLET, FILM COATED ORAL EVERY 6 HOURS PRN
Qty: 28 TABLET | Refills: 0 | Status: SHIPPED | OUTPATIENT
Start: 2025-04-14 | End: 2025-04-21

## 2025-04-14 RX ORDER — METAXALONE 800 MG/1
800 TABLET ORAL 3 TIMES DAILY
Qty: 90 TABLET | Refills: 5 | Status: SHIPPED | OUTPATIENT
Start: 2025-04-14

## 2025-04-14 SDOH — ECONOMIC STABILITY: FOOD INSECURITY: WITHIN THE PAST 12 MONTHS, YOU WORRIED THAT YOUR FOOD WOULD RUN OUT BEFORE YOU GOT MONEY TO BUY MORE.: NEVER TRUE

## 2025-04-14 SDOH — ECONOMIC STABILITY: FOOD INSECURITY: WITHIN THE PAST 12 MONTHS, THE FOOD YOU BOUGHT JUST DIDN'T LAST AND YOU DIDN'T HAVE MONEY TO GET MORE.: NEVER TRUE

## 2025-04-14 NOTE — PROGRESS NOTES
Tried calling patient at 8:15 to begin virtual appointment with RAMANA Adam but patient did not answer. Left voice message and will try calling again shortly. Patient's link has been sent.

## 2025-04-14 NOTE — PROGRESS NOTES
Barbra Williamson, was evaluated through a synchronous (real-time) audio-video encounter. The patient (or guardian if applicable) is aware that this is a billable service, which includes applicable co-pays. This Virtual Visit was conducted with patient's (and/or legal guardian's) consent. Patient identification was verified, and a caregiver was present when appropriate.   The patient was located at Home: [address] [place] [id number]  Provider was located at Facility (Appt Dept): [address] , [facility] [address]  [place] [address]  Confirm you are appropriately licensed, registered, or certified to deliver care in the [facility] where the patient is located as indicated above. If you are not or unsure, please re-schedule the visit: Yes, I confirm.     Barbra Williamson (:  1995) is a Established patient, presenting virtually for evaluation of the following:      Below is the assessment and plan developed based on review of pertinent history, physical exam, labs, studies, and medications.         --Cindy Hunter

## 2025-04-14 NOTE — PROGRESS NOTES
Barbra Williamson is a 29 y.o. (1995) female is a Established patient, presents alone, who was seen by synchronous (real-time) audio-video technology on 4/14/2025 for evaluation of the following:   Chief Complaint   Patient presents with    Anxiety     TMJ    Temporomandibular Joint Pain   History obtained from patient.    Assessment & Plan:     Assessment & Plan  1. Temporomandibular joint disorder (TMJ).  - Reports consistent TMJ symptoms and has been managing with heat, ice, and rotating medications.  - Advised to use a TENS unit for additional relief.  - Instructed to take Skelaxin up to every 6 hours on days of severe spasm.    2. Anxiety.  - Taking BuSpar 10 mg, two tablets three times daily as previously advised during her last visit, instead of the prescribed one tablet three times daily.  - Prescription for BuSpar corrected to reflect the higher dosage of two tablets three times daily.  - Advised to continue this regimen and monitor for any side effects.  - Reports managing anxiety symptoms consistently.    3. Medication management.  - Issues reported with receiving metaxalone (Skelaxin) prescription, last dispensed on 02/11/2025.  - Advised to call the pharmacy to ensure there are no issues with the prescription.  - Prescription drug monitoring program discussed for controlled substances.    Follow-up  - Follow up in 4 months for a routine physical examination.  - Blood work ordered for the next visit, fasting required.    Generalized anxiety disorder  Arthralgia of left temporomandibular joint  -     HYDROcodone-ibuprofen (VICOPROFEN) 7.5-200 MG per tablet; Take 1 tablet by mouth every 6 hours as needed for Pain (take with food) for up to 7 days. Max Daily Amount: 4 tablets, Disp-28 tablet, R-0Normal      Return in about 4 months (around 8/22/2025) for Routine Physical, fasting labs 1 week prior, in office ONLY.    Subjective:     History of Present Illness  The patient presents for a 4-month follow-up

## 2025-06-26 DIAGNOSIS — M26.622 ARTHRALGIA OF LEFT TEMPOROMANDIBULAR JOINT: ICD-10-CM

## 2025-06-26 RX ORDER — HYDROCODONE BITARTRATE AND IBUPROFEN 7.5; 2 MG/1; MG/1
1 TABLET, FILM COATED ORAL EVERY 6 HOURS PRN
Qty: 28 TABLET | Refills: 0 | Status: SHIPPED | OUTPATIENT
Start: 2025-06-26 | End: 2025-07-03

## 2025-07-26 ENCOUNTER — HOSPITAL ENCOUNTER (OUTPATIENT)
Age: 30
Discharge: HOME | End: 2025-07-29

## 2025-07-26 LAB — LABCORP SPECIMEN COLLECTION: NORMAL

## 2025-07-28 LAB
HBA1C MFR BLD: 5.1 % (ref 4.8–5.6)
SPECIMEN STATUS REPORT: NORMAL

## 2025-07-29 LAB
ALBUMIN SERPL-MCNC: 4.4 G/DL (ref 4–5)
ALP SERPL-CCNC: 41 IU/L (ref 44–121)
ALT SERPL-CCNC: 10 IU/L (ref 0–32)
AST SERPL-CCNC: 14 IU/L (ref 0–40)
BILIRUB SERPL-MCNC: <0.2 MG/DL (ref 0–1.2)
BUN SERPL-MCNC: 13 MG/DL (ref 6–20)
BUN/CREAT SERPL: 18 (ref 9–23)
CALCIUM SERPL-MCNC: 9.4 MG/DL (ref 8.7–10.2)
CHLORIDE SERPL-SCNC: 105 MMOL/L (ref 96–106)
CHOLEST SERPL-MCNC: 162 MG/DL (ref 100–199)
CO2 SERPL-SCNC: 21 MMOL/L (ref 20–29)
CREAT SERPL-MCNC: 0.71 MG/DL (ref 0.57–1)
EGFRCR SERPLBLD CKD-EPI 2021: 119 ML/MIN/1.73
GLOBULIN SER CALC-MCNC: 1.8 G/DL (ref 1.5–4.5)
GLUCOSE SERPL-MCNC: 88 MG/DL (ref 70–99)
HDLC SERPL-MCNC: 55 MG/DL
LDLC SERPL CALC-MCNC: 95 MG/DL (ref 0–99)
POTASSIUM SERPL-SCNC: 4.8 MMOL/L (ref 3.5–5.2)
PROT SERPL-MCNC: 6.2 G/DL (ref 6–8.5)
SODIUM SERPL-SCNC: 140 MMOL/L (ref 134–144)
TRIGL SERPL-MCNC: 58 MG/DL (ref 0–149)
VLDLC SERPL CALC-MCNC: 12 MG/DL (ref 5–40)

## 2025-08-01 ENCOUNTER — OFFICE VISIT (OUTPATIENT)
Facility: CLINIC | Age: 30
End: 2025-08-01
Payer: COMMERCIAL

## 2025-08-01 VITALS
BODY MASS INDEX: 23.35 KG/M2 | SYSTOLIC BLOOD PRESSURE: 109 MMHG | HEIGHT: 59 IN | DIASTOLIC BLOOD PRESSURE: 71 MMHG | HEART RATE: 60 BPM | WEIGHT: 115.8 LBS | RESPIRATION RATE: 16 BRPM | OXYGEN SATURATION: 98 % | TEMPERATURE: 98.3 F

## 2025-08-01 DIAGNOSIS — R14.0 ABDOMINAL BLOATING: Primary | ICD-10-CM

## 2025-08-01 DIAGNOSIS — R53.83 OTHER FATIGUE: ICD-10-CM

## 2025-08-01 PROCEDURE — 99214 OFFICE O/P EST MOD 30 MIN: CPT | Performed by: NURSE PRACTITIONER

## 2025-08-04 ASSESSMENT — PATIENT HEALTH QUESTIONNAIRE - PHQ9
1. LITTLE INTEREST OR PLEASURE IN DOING THINGS: SEVERAL DAYS
SUM OF ALL RESPONSES TO PHQ QUESTIONS 1-9: 6
8. MOVING OR SPEAKING SO SLOWLY THAT OTHER PEOPLE COULD HAVE NOTICED. OR THE OPPOSITE, BEING SO FIGETY OR RESTLESS THAT YOU HAVE BEEN MOVING AROUND A LOT MORE THAN USUAL: NOT AT ALL
2. FEELING DOWN, DEPRESSED OR HOPELESS: NOT AT ALL
SUM OF ALL RESPONSES TO PHQ QUESTIONS 1-9: 6
7. TROUBLE CONCENTRATING ON THINGS, SUCH AS READING THE NEWSPAPER OR WATCHING TELEVISION: SEVERAL DAYS
SUM OF ALL RESPONSES TO PHQ QUESTIONS 1-9: 6
SUM OF ALL RESPONSES TO PHQ QUESTIONS 1-9: 6
3. TROUBLE FALLING OR STAYING ASLEEP: SEVERAL DAYS
5. POOR APPETITE OR OVEREATING: MORE THAN HALF THE DAYS
10. IF YOU CHECKED OFF ANY PROBLEMS, HOW DIFFICULT HAVE THESE PROBLEMS MADE IT FOR YOU TO DO YOUR WORK, TAKE CARE OF THINGS AT HOME, OR GET ALONG WITH OTHER PEOPLE: SOMEWHAT DIFFICULT
9. THOUGHTS THAT YOU WOULD BE BETTER OFF DEAD, OR OF HURTING YOURSELF: NOT AT ALL
4. FEELING TIRED OR HAVING LITTLE ENERGY: SEVERAL DAYS
6. FEELING BAD ABOUT YOURSELF - OR THAT YOU ARE A FAILURE OR HAVE LET YOURSELF OR YOUR FAMILY DOWN: NOT AT ALL

## 2025-08-11 ASSESSMENT — ENCOUNTER SYMPTOMS
ABDOMINAL PAIN: 1
ABDOMINAL DISTENTION: 1

## 2025-08-13 ENCOUNTER — TELEPHONE (OUTPATIENT)
Facility: HOSPITAL | Age: 30
End: 2025-08-13

## 2025-08-13 RX ORDER — MUPIROCIN 2 %
OINTMENT (GRAM) TOPICAL
Qty: 30 G | Refills: 0 | Status: SHIPPED | OUTPATIENT
Start: 2025-08-13 | End: 2025-08-20